# Patient Record
Sex: MALE | Race: WHITE | Employment: OTHER | URBAN - METROPOLITAN AREA
[De-identification: names, ages, dates, MRNs, and addresses within clinical notes are randomized per-mention and may not be internally consistent; named-entity substitution may affect disease eponyms.]

---

## 2022-10-02 ENCOUNTER — HOSPITAL ENCOUNTER (INPATIENT)
Age: 72
LOS: 2 days | Discharge: HOME | DRG: 391 | End: 2022-10-04
Attending: EMERGENCY MEDICINE | Admitting: INTERNAL MEDICINE
Payer: COMMERCIAL

## 2022-10-02 DIAGNOSIS — E83.42 HYPOMAGNESEMIA: ICD-10-CM

## 2022-10-02 DIAGNOSIS — R19.7 DIARRHEA, UNSPECIFIED TYPE: Primary | ICD-10-CM

## 2022-10-02 DIAGNOSIS — R53.1 GENERAL WEAKNESS: ICD-10-CM

## 2022-10-02 DIAGNOSIS — E86.0 DEHYDRATION: ICD-10-CM

## 2022-10-02 DIAGNOSIS — E87.6 HYPOKALEMIA: ICD-10-CM

## 2022-10-02 PROBLEM — Z98.0 HISTORY OF BILLROTH II OPERATION: Status: ACTIVE | Noted: 2021-12-08

## 2022-10-02 PROBLEM — C61 PROSTATE CANCER (HCC): Status: ACTIVE | Noted: 2017-06-02

## 2022-10-02 PROBLEM — G56.03 BILATERAL CARPAL TUNNEL SYNDROME: Status: ACTIVE | Noted: 2021-10-04

## 2022-10-02 PROBLEM — I10 ESSENTIAL HYPERTENSION: Status: ACTIVE | Noted: 2022-10-02

## 2022-10-02 PROBLEM — K92.1 GASTROINTESTINAL HEMORRHAGE WITH MELENA: Status: ACTIVE | Noted: 2022-06-03

## 2022-10-02 PROBLEM — D50.0 IRON DEFICIENCY ANEMIA DUE TO CHRONIC BLOOD LOSS: Status: ACTIVE | Noted: 2021-05-06

## 2022-10-02 PROBLEM — E78.2 MIXED HYPERLIPIDEMIA: Status: ACTIVE | Noted: 2022-10-02

## 2022-10-02 PROBLEM — C91.10 CHRONIC LYMPHOCYTIC LEUKEMIA (HCC): Status: ACTIVE | Noted: 2018-02-28

## 2022-10-02 PROBLEM — E11.40 DIABETIC NEUROPATHY (HCC): Status: ACTIVE | Noted: 2021-03-19

## 2022-10-02 PROBLEM — E43 SEVERE MALNUTRITION (HCC): Status: ACTIVE | Noted: 2022-09-16

## 2022-10-02 PROBLEM — G25.81 RESTLESS LEGS: Status: ACTIVE | Noted: 2017-06-02

## 2022-10-02 LAB
ABSOLUTE EOS #: <0.03 K/UL (ref 0–0.44)
ABSOLUTE IMMATURE GRANULOCYTE: 0.02 K/UL (ref 0–0.3)
ABSOLUTE LYMPH #: 1.51 K/UL (ref 1.1–3.7)
ABSOLUTE MONO #: 0.27 K/UL (ref 0.1–1.2)
ALBUMIN SERPL-MCNC: 3.4 G/DL (ref 3.5–5.2)
ALP BLD-CCNC: 158 U/L (ref 40–129)
ALT SERPL-CCNC: 8 U/L (ref 5–41)
ANION GAP SERPL CALCULATED.3IONS-SCNC: 17 MMOL/L (ref 9–17)
AST SERPL-CCNC: 11 U/L
BASOPHILS # BLD: 1 % (ref 0–2)
BASOPHILS ABSOLUTE: <0.03 K/UL (ref 0–0.2)
BILIRUB SERPL-MCNC: 0.4 MG/DL (ref 0.3–1.2)
BILIRUBIN DIRECT: 0.1 MG/DL
BILIRUBIN, INDIRECT: 0.3 MG/DL (ref 0–1)
BUN BLDV-MCNC: 19 MG/DL (ref 8–23)
BUN/CREAT BLD: 18 (ref 9–20)
CALCIUM SERPL-MCNC: 8.2 MG/DL (ref 8.6–10.4)
CHLORIDE BLD-SCNC: 95 MMOL/L (ref 98–107)
CO2: 21 MMOL/L (ref 20–31)
CREAT SERPL-MCNC: 1.08 MG/DL (ref 0.7–1.2)
EOSINOPHILS RELATIVE PERCENT: 0 % (ref 1–4)
GFR AFRICAN AMERICAN: >60 ML/MIN
GFR NON-AFRICAN AMERICAN: >60 ML/MIN
GFR SERPL CREATININE-BSD FRML MDRD: ABNORMAL ML/MIN/{1.73_M2}
GLUCOSE BLD-MCNC: 176 MG/DL (ref 70–99)
HCT VFR BLD CALC: 40.8 % (ref 40.7–50.3)
HEMOCCULT SP1 STL QL: NEGATIVE
HEMOGLOBIN: 12.9 G/DL (ref 13–17)
IMMATURE GRANULOCYTES: 1 %
INR BLD: 0.9
LIPASE: 11 U/L (ref 13–60)
LYMPHOCYTES # BLD: 42 % (ref 24–43)
MAGNESIUM: 1.4 MG/DL (ref 1.6–2.6)
MCH RBC QN AUTO: 30.1 PG (ref 25.2–33.5)
MCHC RBC AUTO-ENTMCNC: 31.6 G/DL (ref 28–38)
MCV RBC AUTO: 95.1 FL (ref 82.6–102.9)
MONOCYTES # BLD: 7 % (ref 3–12)
PARTIAL THROMBOPLASTIN TIME: 32.4 SEC (ref 23.9–33.8)
PDW BLD-RTO: 14.2 % (ref 11.8–14.4)
PHOSPHORUS: 3.7 MG/DL (ref 2.5–4.5)
PLATELET # BLD: 343 K/UL (ref 138–453)
PMV BLD AUTO: 9.4 FL (ref 8.1–13.5)
POTASSIUM SERPL-SCNC: 3 MMOL/L (ref 3.7–5.3)
PROTHROMBIN TIME: 12.2 SEC (ref 11.5–14.2)
RBC # BLD: 4.29 M/UL (ref 4.21–5.77)
SEG NEUTROPHILS: 50 % (ref 36–65)
SEGMENTED NEUTROPHILS ABSOLUTE COUNT: 1.8 K/UL (ref 1.5–8.1)
SODIUM BLD-SCNC: 133 MMOL/L (ref 135–144)
TOTAL PROTEIN: 5.9 G/DL (ref 6.4–8.3)
TROPONIN, HIGH SENSITIVITY: 17 NG/L (ref 0–22)
WBC # BLD: 3.6 K/UL (ref 3.5–11.3)

## 2022-10-02 PROCEDURE — 2580000003 HC RX 258: Performed by: EMERGENCY MEDICINE

## 2022-10-02 PROCEDURE — 6370000000 HC RX 637 (ALT 250 FOR IP): Performed by: EMERGENCY MEDICINE

## 2022-10-02 PROCEDURE — 2580000003 HC RX 258: Performed by: NURSE PRACTITIONER

## 2022-10-02 PROCEDURE — 83690 ASSAY OF LIPASE: CPT

## 2022-10-02 PROCEDURE — 87205 SMEAR GRAM STAIN: CPT

## 2022-10-02 PROCEDURE — 1200000000 HC SEMI PRIVATE

## 2022-10-02 PROCEDURE — 83735 ASSAY OF MAGNESIUM: CPT

## 2022-10-02 PROCEDURE — 93005 ELECTROCARDIOGRAM TRACING: CPT | Performed by: EMERGENCY MEDICINE

## 2022-10-02 PROCEDURE — 6360000002 HC RX W HCPCS: Performed by: NURSE PRACTITIONER

## 2022-10-02 PROCEDURE — 82272 OCCULT BLD FECES 1-3 TESTS: CPT

## 2022-10-02 PROCEDURE — 85730 THROMBOPLASTIN TIME PARTIAL: CPT

## 2022-10-02 PROCEDURE — 99285 EMERGENCY DEPT VISIT HI MDM: CPT

## 2022-10-02 PROCEDURE — 99222 1ST HOSP IP/OBS MODERATE 55: CPT | Performed by: NURSE PRACTITIONER

## 2022-10-02 PROCEDURE — 80048 BASIC METABOLIC PNL TOTAL CA: CPT

## 2022-10-02 PROCEDURE — 87449 NOS EACH ORGANISM AG IA: CPT

## 2022-10-02 PROCEDURE — 85610 PROTHROMBIN TIME: CPT

## 2022-10-02 PROCEDURE — 87324 CLOSTRIDIUM AG IA: CPT

## 2022-10-02 PROCEDURE — 87506 IADNA-DNA/RNA PROBE TQ 6-11: CPT

## 2022-10-02 PROCEDURE — 80076 HEPATIC FUNCTION PANEL: CPT

## 2022-10-02 PROCEDURE — 6360000002 HC RX W HCPCS: Performed by: EMERGENCY MEDICINE

## 2022-10-02 PROCEDURE — 84100 ASSAY OF PHOSPHORUS: CPT

## 2022-10-02 PROCEDURE — 85025 COMPLETE CBC W/AUTO DIFF WBC: CPT

## 2022-10-02 PROCEDURE — 84484 ASSAY OF TROPONIN QUANT: CPT

## 2022-10-02 PROCEDURE — 2500000003 HC RX 250 WO HCPCS: Performed by: NURSE PRACTITIONER

## 2022-10-02 RX ORDER — TRAMADOL HYDROCHLORIDE 50 MG/1
50 TABLET ORAL EVERY 8 HOURS PRN
COMMUNITY

## 2022-10-02 RX ORDER — MAGNESIUM SULFATE IN WATER 40 MG/ML
2000 INJECTION, SOLUTION INTRAVENOUS ONCE
Status: COMPLETED | OUTPATIENT
Start: 2022-10-02 | End: 2022-10-02

## 2022-10-02 RX ORDER — DIPHENOXYLATE HYDROCHLORIDE AND ATROPINE SULFATE 2.5; .025 MG/1; MG/1
1 TABLET ORAL 4 TIMES DAILY PRN
COMMUNITY
Start: 2022-09-09

## 2022-10-02 RX ORDER — MAGNESIUM SULFATE 1 G/100ML
1000 INJECTION INTRAVENOUS PRN
Status: DISCONTINUED | OUTPATIENT
Start: 2022-10-02 | End: 2022-10-04 | Stop reason: HOSPADM

## 2022-10-02 RX ORDER — 0.9 % SODIUM CHLORIDE 0.9 %
1000 INTRAVENOUS SOLUTION INTRAVENOUS ONCE
Status: COMPLETED | OUTPATIENT
Start: 2022-10-02 | End: 2022-10-02

## 2022-10-02 RX ORDER — ONDANSETRON 2 MG/ML
4 INJECTION INTRAMUSCULAR; INTRAVENOUS EVERY 6 HOURS PRN
Status: DISCONTINUED | OUTPATIENT
Start: 2022-10-02 | End: 2022-10-04 | Stop reason: HOSPADM

## 2022-10-02 RX ORDER — ACETAMINOPHEN 650 MG/1
650 SUPPOSITORY RECTAL EVERY 6 HOURS PRN
Status: DISCONTINUED | OUTPATIENT
Start: 2022-10-02 | End: 2022-10-04 | Stop reason: HOSPADM

## 2022-10-02 RX ORDER — ZOLPIDEM TARTRATE 5 MG/1
5 TABLET ORAL NIGHTLY PRN
COMMUNITY

## 2022-10-02 RX ORDER — ENOXAPARIN SODIUM 100 MG/ML
40 INJECTION SUBCUTANEOUS DAILY
Status: DISCONTINUED | OUTPATIENT
Start: 2022-10-03 | End: 2022-10-04 | Stop reason: HOSPADM

## 2022-10-02 RX ORDER — FEXOFENADINE HCL 180 MG/1
TABLET ORAL
Status: ON HOLD | COMMUNITY
End: 2022-10-03

## 2022-10-02 RX ORDER — POTASSIUM CHLORIDE 20 MEQ/1
40 TABLET, EXTENDED RELEASE ORAL ONCE
Status: COMPLETED | OUTPATIENT
Start: 2022-10-02 | End: 2022-10-02

## 2022-10-02 RX ORDER — PEN NEEDLE, DIABETIC 32GX 5/32"
NEEDLE, DISPOSABLE MISCELLANEOUS
COMMUNITY

## 2022-10-02 RX ORDER — ONDANSETRON 4 MG/1
4 TABLET, ORALLY DISINTEGRATING ORAL EVERY 8 HOURS PRN
Status: DISCONTINUED | OUTPATIENT
Start: 2022-10-02 | End: 2022-10-04 | Stop reason: HOSPADM

## 2022-10-02 RX ORDER — GABAPENTIN 100 MG/1
100 CAPSULE ORAL 3 TIMES DAILY
COMMUNITY

## 2022-10-02 RX ORDER — POTASSIUM CHLORIDE 7.45 MG/ML
10 INJECTION INTRAVENOUS PRN
Status: DISCONTINUED | OUTPATIENT
Start: 2022-10-02 | End: 2022-10-04 | Stop reason: HOSPADM

## 2022-10-02 RX ORDER — INSULIN GLARGINE 100 [IU]/ML
12 INJECTION, SOLUTION SUBCUTANEOUS DAILY
COMMUNITY

## 2022-10-02 RX ORDER — DEXTROSE, SODIUM CHLORIDE, AND POTASSIUM CHLORIDE 5; .45; .15 G/100ML; G/100ML; G/100ML
INJECTION INTRAVENOUS CONTINUOUS
Status: DISCONTINUED | OUTPATIENT
Start: 2022-10-02 | End: 2022-10-02

## 2022-10-02 RX ORDER — SODIUM CHLORIDE 0.9 % (FLUSH) 0.9 %
5-40 SYRINGE (ML) INJECTION EVERY 12 HOURS SCHEDULED
Status: DISCONTINUED | OUTPATIENT
Start: 2022-10-02 | End: 2022-10-04 | Stop reason: HOSPADM

## 2022-10-02 RX ORDER — POLYETHYLENE GLYCOL 3350 17 G/17G
17 POWDER, FOR SOLUTION ORAL DAILY PRN
Status: DISCONTINUED | OUTPATIENT
Start: 2022-10-02 | End: 2022-10-04 | Stop reason: HOSPADM

## 2022-10-02 RX ORDER — HYDROCODONE BITARTRATE AND ACETAMINOPHEN 10; 325 MG/1; MG/1
TABLET ORAL
Status: ON HOLD | COMMUNITY
End: 2022-10-03

## 2022-10-02 RX ORDER — COVID-19 ANTIGEN TEST
KIT MISCELLANEOUS
Status: ON HOLD | COMMUNITY
End: 2022-10-04 | Stop reason: HOSPADM

## 2022-10-02 RX ORDER — POTASSIUM CHLORIDE AND SODIUM CHLORIDE 900; 300 MG/100ML; MG/100ML
INJECTION, SOLUTION INTRAVENOUS CONTINUOUS
Status: DISCONTINUED | OUTPATIENT
Start: 2022-10-02 | End: 2022-10-04

## 2022-10-02 RX ORDER — ACETAMINOPHEN 325 MG/1
650 TABLET ORAL EVERY 6 HOURS PRN
Status: DISCONTINUED | OUTPATIENT
Start: 2022-10-02 | End: 2022-10-04 | Stop reason: HOSPADM

## 2022-10-02 RX ORDER — POTASSIUM CHLORIDE 20 MEQ/1
40 TABLET, EXTENDED RELEASE ORAL PRN
Status: DISCONTINUED | OUTPATIENT
Start: 2022-10-02 | End: 2022-10-04 | Stop reason: HOSPADM

## 2022-10-02 RX ORDER — SODIUM CHLORIDE 0.9 % (FLUSH) 0.9 %
10 SYRINGE (ML) INJECTION PRN
Status: DISCONTINUED | OUTPATIENT
Start: 2022-10-02 | End: 2022-10-04 | Stop reason: HOSPADM

## 2022-10-02 RX ORDER — LOPERAMIDE HYDROCHLORIDE 2 MG/1
2 TABLET ORAL 4 TIMES DAILY PRN
COMMUNITY
Start: 2022-09-29

## 2022-10-02 RX ORDER — SODIUM CHLORIDE 9 MG/ML
INJECTION, SOLUTION INTRAVENOUS PRN
Status: DISCONTINUED | OUTPATIENT
Start: 2022-10-02 | End: 2022-10-04 | Stop reason: HOSPADM

## 2022-10-02 RX ORDER — CHOLECALCIFEROL (VITAMIN D3) 50 MCG
TABLET ORAL
COMMUNITY
Start: 2022-09-18

## 2022-10-02 RX ORDER — FAMOTIDINE 20 MG/1
20 TABLET, FILM COATED ORAL DAILY
COMMUNITY
Start: 2022-09-30 | End: 2022-10-30

## 2022-10-02 RX ADMIN — POTASSIUM CHLORIDE, DEXTROSE MONOHYDRATE AND SODIUM CHLORIDE: 150; 5; 450 INJECTION, SOLUTION INTRAVENOUS at 23:07

## 2022-10-02 RX ADMIN — SODIUM CHLORIDE, PRESERVATIVE FREE 10 ML: 5 INJECTION INTRAVENOUS at 23:05

## 2022-10-02 RX ADMIN — POTASSIUM CHLORIDE AND SODIUM CHLORIDE: 900; 300 INJECTION, SOLUTION INTRAVENOUS at 23:59

## 2022-10-02 RX ADMIN — SODIUM CHLORIDE 1000 ML: 9 INJECTION, SOLUTION INTRAVENOUS at 20:09

## 2022-10-02 RX ADMIN — POTASSIUM CHLORIDE 40 MEQ: 1500 TABLET, EXTENDED RELEASE ORAL at 21:05

## 2022-10-02 RX ADMIN — MAGNESIUM SULFATE HEPTAHYDRATE 2000 MG: 40 INJECTION, SOLUTION INTRAVENOUS at 21:05

## 2022-10-02 ASSESSMENT — PAIN - FUNCTIONAL ASSESSMENT: PAIN_FUNCTIONAL_ASSESSMENT: NONE - DENIES PAIN

## 2022-10-02 ASSESSMENT — ENCOUNTER SYMPTOMS
NAUSEA: 0
TROUBLE SWALLOWING: 0
DIARRHEA: 1
ABDOMINAL PAIN: 0
VOMITING: 0
PHOTOPHOBIA: 0
SHORTNESS OF BREATH: 0
COLOR CHANGE: 0
COUGH: 0

## 2022-10-02 NOTE — ED PROVIDER NOTES
EMERGENCY DEPARTMENT ENCOUNTER    Pt Name: Sarah Torres  MRN: [id number]  [place] 1950  Date of evaluation: 10/2/22  CHIEF COMPLAINT       Chief Complaint   Patient presents with    Dehydration    Diarrhea    Fatigue     HISTORY OF PRESENT ILLNESS   26-year-old male presents to the ER with generalized weakness. Patient states he has been having diarrhea for the last 3 days. Patient states he was admitted recently for diarrhea and they got him to feel better but when he went home he started to have diarrhea again. Patient states that all started roughly 6 weeks ago but did have a period of feeling better after being discharged from the hospital but the symptoms started again 3 days ago. The history is provided by the patient. Diarrhea  Quality:  Watery  Severity:  Moderate  Onset quality:  Gradual  Duration:  3 days  Timing:  Constant  Progression:  Worsening  Relieved by:  Nothing  Worsened by:  Nothing  Ineffective treatments:  None tried  Associated symptoms: no abdominal pain, no arthralgias, no fever, no myalgias and no vomiting          REVIEW OF SYSTEMS     Review of Systems   Constitutional:  Negative for activity change, fatigue and fever. HENT:  Negative for congestion, ear pain and trouble swallowing. Eyes:  Negative for photophobia and visual disturbance. Respiratory:  Negative for cough and shortness of breath. Cardiovascular:  Negative for chest pain and palpitations. Gastrointestinal:  Positive for diarrhea. Negative for abdominal pain, nausea and vomiting. Genitourinary:  Negative for dysuria, flank pain and urgency. Musculoskeletal:  Negative for arthralgias and myalgias. Skin:  Negative for color change and rash. Neurological:  Negative for dizziness and facial asymmetry. Psychiatric/Behavioral:  Negative for agitation and behavioral problems.     PASTMEDICAL HISTORY     Past Medical History:   Diagnosis Date    Cancer ([facility]     Diabetes mellitus ([facility]      Past Problem List  There is no problem list on file for this patient. SURGICAL HISTORY     History reviewed. No pertinent surgical history. CURRENT MEDICATIONS       Previous Medications    CYANOCOBALAMIN 100 MCG TABLET    Take 100 mcg by mouth daily    DIPHENOXYLATE-ATROPINE (LOMOTIL) 2.5-0.025 MG PER TABLET    Take 1 tablet by mouth 4 times daily as needed. FAMOTIDINE (PEPCID) 20 MG TABLET    Take 20 mg by mouth daily    FEXOFENADINE (ALLEGRA) 180 MG TABLET    1 tablet    HYDROCODONE-ACETAMINOPHEN (NORCO)  MG PER TABLET        INSULIN GLARGINE (BASAGLAR KWIKPEN) 100 UNIT/ML INJECTION PEN    inject 15 units subcutaneously daily    INSULIN PEN NEEDLE (BD PEN NEEDLE AMIE 2ND GEN) 32G X 4 MM MISC    use 1 PEN NEEDLE to inject MEDICATION subcutaneously once daily    LOPERAMIDE (IMODIUM A-D) 2 MG TABLET    Take 2 mg by mouth 4 times daily as needed    METFORMIN  MG/5ML SOLN    2 tabs    NAPROXEN SODIUM 220 MG CAPS    1 tablet with food or milk as needed    SITAGLIPTIN (JANUVIA) 100 MG TABLET    take 1 tablet by mouth once daily    TRAMADOL (ULTRAM) 50 MG TABLET    1 tablet as needed    VITAMIN D (CHOLECALCIFEROL) 50 MCG (2000 UT) TABS TABLET    take 1 tablet by mouth every morning    ZOLPIDEM (AMBIEN) 10 MG TABLET    1 tablet at bedtime as needed     ALLERGIES     has No Known Allergies. FAMILY HISTORY     has no family status information on file. SOCIAL HISTORY       Social History     Tobacco Use    Smoking status: Never   Substance Use Topics    Alcohol use: Not Currently    Drug use: Never     PHYSICAL EXAM     INITIAL VITALS: /65   Pulse 89   Temp 97.5 °F (36.4 °C)   Resp 12   Ht 5' 10\" (1.778 m)   Wt 130 lb (59 kg)   SpO2 99%   BMI 18.65 kg/m²    Physical Exam  Constitutional:       General: He is not in acute distress. Appearance: Normal appearance. HENT:      Head: Normocephalic and atraumatic.       Right Ear: External ear normal.      Left Ear: External ear normal. Nose: Nose normal. No congestion or rhinorrhea. Eyes:      Extraocular Movements: Extraocular movements intact. Pupils: Pupils are equal, round, and reactive to light. Cardiovascular:      Rate and Rhythm: Regular rhythm. Tachycardia present. Pulses: Normal pulses. Heart sounds: Normal heart sounds. Pulmonary:      Effort: Pulmonary effort is normal. No respiratory distress. Breath sounds: Normal breath sounds. Abdominal:      General: Bowel sounds are normal.      Palpations: Abdomen is soft. Tenderness: There is no abdominal tenderness. Musculoskeletal:         General: No deformity. Normal range of motion. Cervical back: Normal range of motion. No rigidity. Skin:     General: Skin is warm and dry. Neurological:      General: No focal deficit present. Mental Status: He is alert and oriented to person, place, and time. Mental status is at baseline. Psychiatric:         Mood and Affect: Mood normal.         Behavior: Behavior normal.       MEDICAL DECISION MAKING:   Patient with generalized weakness likely secondary to dehydration. Cardiac work-up in the ER did not display positive signs of acute cardiac ischemia. EKG was reviewed and interpreted by myself, ED physician. Patient provided with fluid resuscitation in the ER which did help with the tachycardia. Patient with hypokalemia as well as hypomagnesemia. Those 2 were replaced in the ER. Patient did come to this facility for a different opinion as compared to the last time he was admitted for the diarrhea. Patient was admitted after speaking with the admitting team.  Patient understands and agrees with the plan.          CRITICAL CARE:       PROCEDURES:    Procedures    DIAGNOSTIC RESULTS   EKG:All EKG's are interpreted by the Emergency Department Physician who either signs or Co-signs this chart in the absence of a cardiologist.        RADIOLOGY:All plain film, CT, MRI, and formal ultrasound images (except ED bedside ultrasound) are read by the radiologist, see reports below, unless otherwisenoted in MDM or here. No orders to display     LABS: All lab results were reviewed by myself, and all abnormals are listed below. Labs Reviewed   MAGNESIUM - Abnormal; Notable for the following components:       Result Value    Magnesium 1.4 (*)     All other components within normal limits   BASIC METABOLIC PANEL - Abnormal; Notable for the following components:    Glucose 176 (*)     Calcium 8.2 (*)     Sodium 133 (*)     Potassium 3.0 (*)     Chloride 95 (*)     All other components within normal limits   CBC WITH AUTO DIFFERENTIAL - Abnormal; Notable for the following components:    Hemoglobin 12.9 (*)     Eosinophils % 0 (*)     Immature Granulocytes 1 (*)     All other components within normal limits   HEPATIC FUNCTION PANEL - Abnormal; Notable for the following components:    Albumin 3.4 (*)     Alkaline Phosphatase 158 (*)     Total Protein 5.9 (*)     All other components within normal limits   LIPASE - Abnormal; Notable for the following components:    Lipase 11 (*)     All other components within normal limits   TROPONIN   APTT   PROTIME-INR   PHOSPHORUS   TROPONIN       EMERGENCY DEPARTMENTCOURSE:         Vitals:    Vitals:    10/02/22 2018 10/02/22 2019 10/02/22 2020 10/02/22 2021   BP:       Pulse: 90 88 89 89   Resp: 13 17 13 12   Temp:       SpO2: 98% 98% 98% 99%   Weight:       Height:           The patient was given the following medications while in the emergency department:  Orders Placed This Encounter   Medications    0.9 % sodium chloride bolus    potassium chloride (KLOR-CON M) extended release tablet 40 mEq    magnesium sulfate 2000 mg in 50 mL IVPB premix     CONSULTS:  IP CONSULT TO INTERNAL MEDICINE    FINAL IMPRESSION      1. Diarrhea, unspecified type    2. Hypokalemia    3. Hypomagnesemia    4. Dehydration    5.  General weakness          DISPOSITION/PLAN   DISPOSITION Decision To Admit 10/02/2022 08:21:04 PM      PATIENT REFERRED TO:  No follow-up provider specified. DISCHARGE MEDICATIONS:  New Prescriptions    No medications on file     The care is provided during an unprecedented national emergency due to the novel coronavirus, COVID 19.   DO Jason Fairbanks DO  10/02/22 2030

## 2022-10-03 PROBLEM — K63.89 SMALL INTESTINAL BACTERIAL OVERGROWTH: Status: ACTIVE | Noted: 2022-10-03

## 2022-10-03 PROBLEM — Z79.4 TYPE 2 DIABETES MELLITUS WITH HYPERGLYCEMIA, WITH LONG-TERM CURRENT USE OF INSULIN (HCC): Status: ACTIVE | Noted: 2022-10-03

## 2022-10-03 PROBLEM — E11.65 TYPE 2 DIABETES MELLITUS WITH HYPERGLYCEMIA, WITH LONG-TERM CURRENT USE OF INSULIN (HCC): Status: ACTIVE | Noted: 2022-10-03

## 2022-10-03 LAB
ALBUMIN SERPL-MCNC: 2.6 G/DL (ref 3.5–5.2)
ALP BLD-CCNC: 108 U/L (ref 40–129)
ALT SERPL-CCNC: 6 U/L (ref 5–41)
ANION GAP SERPL CALCULATED.3IONS-SCNC: 9 MMOL/L (ref 9–17)
AST SERPL-CCNC: 8 U/L
BILIRUB SERPL-MCNC: 0.3 MG/DL (ref 0.3–1.2)
BUN BLDV-MCNC: 16 MG/DL (ref 8–23)
BUN/CREAT BLD: 20 (ref 9–20)
C DIFF AG + TOXIN: NEGATIVE
CALCIUM SERPL-MCNC: 6.7 MG/DL (ref 8.6–10.4)
CHLORIDE BLD-SCNC: 107 MMOL/L (ref 98–107)
CO2: 23 MMOL/L (ref 20–31)
CREAT SERPL-MCNC: 0.8 MG/DL (ref 0.7–1.2)
EKG ATRIAL RATE: 112 BPM
EKG P-R INTERVAL: 112 MS
EKG Q-T INTERVAL: 462 MS
EKG QRS DURATION: 90 MS
EKG QTC CALCULATION (BAZETT): 630 MS
EKG R AXIS: 83 DEGREES
EKG T AXIS: 77 DEGREES
EKG VENTRICULAR RATE: 112 BPM
GFR AFRICAN AMERICAN: >60 ML/MIN
GFR NON-AFRICAN AMERICAN: >60 ML/MIN
GFR SERPL CREATININE-BSD FRML MDRD: ABNORMAL ML/MIN/{1.73_M2}
GLUCOSE BLD-MCNC: 127 MG/DL (ref 75–110)
GLUCOSE BLD-MCNC: 131 MG/DL (ref 75–110)
GLUCOSE BLD-MCNC: 176 MG/DL (ref 75–110)
GLUCOSE BLD-MCNC: 193 MG/DL (ref 75–110)
GLUCOSE BLD-MCNC: 73 MG/DL (ref 75–110)
GLUCOSE BLD-MCNC: 92 MG/DL (ref 70–99)
LACTOFERRIN, QUAL: ABNORMAL
LIPASE: 8 U/L (ref 13–60)
MAGNESIUM: 1.5 MG/DL (ref 1.6–2.6)
MAGNESIUM: 1.6 MG/DL (ref 1.6–2.6)
PHOSPHORUS: 1.9 MG/DL (ref 2.5–4.5)
POTASSIUM SERPL-SCNC: 3.6 MMOL/L (ref 3.7–5.3)
SEDIMENTATION RATE, ERYTHROCYTE: <1 MM/HR (ref 0–20)
SODIUM BLD-SCNC: 139 MMOL/L (ref 135–144)
SPECIMEN DESCRIPTION: NORMAL
TOTAL PROTEIN: 4.1 G/DL (ref 6.4–8.3)
TROPONIN, HIGH SENSITIVITY: 20 NG/L (ref 0–22)
TSH SERPL DL<=0.05 MIU/L-ACNC: 2.25 UIU/ML (ref 0.3–5)

## 2022-10-03 PROCEDURE — 1200000000 HC SEMI PRIVATE

## 2022-10-03 PROCEDURE — 84484 ASSAY OF TROPONIN QUANT: CPT

## 2022-10-03 PROCEDURE — 99222 1ST HOSP IP/OBS MODERATE 55: CPT | Performed by: INTERNAL MEDICINE

## 2022-10-03 PROCEDURE — 85652 RBC SED RATE AUTOMATED: CPT

## 2022-10-03 PROCEDURE — 82947 ASSAY GLUCOSE BLOOD QUANT: CPT

## 2022-10-03 PROCEDURE — 83516 IMMUNOASSAY NONANTIBODY: CPT

## 2022-10-03 PROCEDURE — 83630 LACTOFERRIN FECAL (QUAL): CPT

## 2022-10-03 PROCEDURE — 84100 ASSAY OF PHOSPHORUS: CPT

## 2022-10-03 PROCEDURE — 82705 FATS/LIPIDS FECES QUAL: CPT

## 2022-10-03 PROCEDURE — 6370000000 HC RX 637 (ALT 250 FOR IP): Performed by: NURSE PRACTITIONER

## 2022-10-03 PROCEDURE — 6360000002 HC RX W HCPCS: Performed by: NURSE PRACTITIONER

## 2022-10-03 PROCEDURE — 83520 IMMUNOASSAY QUANT NOS NONAB: CPT

## 2022-10-03 PROCEDURE — 83735 ASSAY OF MAGNESIUM: CPT

## 2022-10-03 PROCEDURE — 84443 ASSAY THYROID STIM HORMONE: CPT

## 2022-10-03 PROCEDURE — 36415 COLL VENOUS BLD VENIPUNCTURE: CPT

## 2022-10-03 PROCEDURE — 80053 COMPREHEN METABOLIC PANEL: CPT

## 2022-10-03 PROCEDURE — 83690 ASSAY OF LIPASE: CPT

## 2022-10-03 PROCEDURE — 99232 SBSQ HOSP IP/OBS MODERATE 35: CPT | Performed by: INTERNAL MEDICINE

## 2022-10-03 RX ORDER — VITAMIN B COMPLEX
2000 TABLET ORAL DAILY
Status: DISCONTINUED | OUTPATIENT
Start: 2022-10-03 | End: 2022-10-04 | Stop reason: HOSPADM

## 2022-10-03 RX ORDER — CETIRIZINE HYDROCHLORIDE 10 MG/1
10 TABLET ORAL DAILY
Status: DISCONTINUED | OUTPATIENT
Start: 2022-10-03 | End: 2022-10-04 | Stop reason: HOSPADM

## 2022-10-03 RX ORDER — GABAPENTIN 100 MG/1
100 CAPSULE ORAL 3 TIMES DAILY
Status: DISCONTINUED | OUTPATIENT
Start: 2022-10-03 | End: 2022-10-04 | Stop reason: HOSPADM

## 2022-10-03 RX ORDER — LEVOCETIRIZINE DIHYDROCHLORIDE 5 MG/1
5 TABLET, FILM COATED ORAL NIGHTLY
Status: ON HOLD | COMMUNITY
End: 2022-10-04 | Stop reason: HOSPADM

## 2022-10-03 RX ORDER — FUROSEMIDE 20 MG/1
20 TABLET ORAL DAILY PRN
Status: ON HOLD | COMMUNITY
End: 2022-10-04 | Stop reason: HOSPADM

## 2022-10-03 RX ORDER — FERROUS SULFATE 325(65) MG
325 TABLET ORAL
COMMUNITY

## 2022-10-03 RX ORDER — UBIDECARENONE 75 MG
100 CAPSULE ORAL DAILY
Status: DISCONTINUED | OUTPATIENT
Start: 2022-10-03 | End: 2022-10-04 | Stop reason: HOSPADM

## 2022-10-03 RX ORDER — CHOLESTYRAMINE LIGHT 4 G/5.7G
4 POWDER, FOR SUSPENSION ORAL 2 TIMES DAILY
Status: DISCONTINUED | OUTPATIENT
Start: 2022-10-03 | End: 2022-10-04 | Stop reason: HOSPADM

## 2022-10-03 RX ORDER — INSULIN GLARGINE 100 [IU]/ML
12 INJECTION, SOLUTION SUBCUTANEOUS DAILY
Status: DISCONTINUED | OUTPATIENT
Start: 2022-10-03 | End: 2022-10-04 | Stop reason: HOSPADM

## 2022-10-03 RX ORDER — ASCORBIC ACID 500 MG
500 TABLET ORAL DAILY
COMMUNITY

## 2022-10-03 RX ORDER — INSULIN LISPRO 100 [IU]/ML
0-4 INJECTION, SOLUTION INTRAVENOUS; SUBCUTANEOUS
Status: DISCONTINUED | OUTPATIENT
Start: 2022-10-03 | End: 2022-10-04 | Stop reason: HOSPADM

## 2022-10-03 RX ORDER — FAMOTIDINE 20 MG/1
20 TABLET, FILM COATED ORAL DAILY
Status: DISCONTINUED | OUTPATIENT
Start: 2022-10-03 | End: 2022-10-04 | Stop reason: HOSPADM

## 2022-10-03 RX ORDER — DEXTROSE MONOHYDRATE 100 MG/ML
INJECTION, SOLUTION INTRAVENOUS CONTINUOUS PRN
Status: DISCONTINUED | OUTPATIENT
Start: 2022-10-03 | End: 2022-10-04 | Stop reason: HOSPADM

## 2022-10-03 RX ORDER — INSULIN LISPRO 100 [IU]/ML
0-4 INJECTION, SOLUTION INTRAVENOUS; SUBCUTANEOUS NIGHTLY
Status: DISCONTINUED | OUTPATIENT
Start: 2022-10-03 | End: 2022-10-04 | Stop reason: HOSPADM

## 2022-10-03 RX ADMIN — PROBIOTIC PRODUCT - TAB 1 TABLET: TAB at 17:52

## 2022-10-03 RX ADMIN — MAGNESIUM SULFATE HEPTAHYDRATE 1000 MG: 1 INJECTION, SOLUTION INTRAVENOUS at 22:10

## 2022-10-03 RX ADMIN — CETIRIZINE HYDROCHLORIDE 10 MG: 10 TABLET ORAL at 09:46

## 2022-10-03 RX ADMIN — FAMOTIDINE 20 MG: 20 TABLET, FILM COATED ORAL at 09:46

## 2022-10-03 RX ADMIN — PROBIOTIC PRODUCT - TAB 1 TABLET: TAB at 09:46

## 2022-10-03 RX ADMIN — GABAPENTIN 100 MG: 100 CAPSULE ORAL at 09:46

## 2022-10-03 RX ADMIN — INSULIN GLARGINE 12 UNITS: 100 INJECTION, SOLUTION SUBCUTANEOUS at 17:52

## 2022-10-03 RX ADMIN — MAGNESIUM SULFATE HEPTAHYDRATE 1000 MG: 1 INJECTION, SOLUTION INTRAVENOUS at 20:57

## 2022-10-03 RX ADMIN — Medication 2000 UNITS: at 09:46

## 2022-10-03 RX ADMIN — PROBIOTIC PRODUCT - TAB 1 TABLET: TAB at 12:34

## 2022-10-03 RX ADMIN — CHOLESTYRAMINE 4 G: 4 POWDER, FOR SUSPENSION ORAL at 20:55

## 2022-10-03 RX ADMIN — VITAM B12 100 MCG: 100 TAB at 09:46

## 2022-10-03 RX ADMIN — POTASSIUM CHLORIDE AND SODIUM CHLORIDE: 900; 300 INJECTION, SOLUTION INTRAVENOUS at 13:24

## 2022-10-03 RX ADMIN — GABAPENTIN 100 MG: 100 CAPSULE ORAL at 17:53

## 2022-10-03 RX ADMIN — GABAPENTIN 100 MG: 100 CAPSULE ORAL at 20:00

## 2022-10-03 RX ADMIN — CHOLESTYRAMINE 4 G: 4 POWDER, FOR SUSPENSION ORAL at 12:34

## 2022-10-03 ASSESSMENT — ENCOUNTER SYMPTOMS
ABDOMINAL PAIN: 0
DIARRHEA: 1
CONSTIPATION: 0
VOMITING: 1
EYES NEGATIVE: 1
NAUSEA: 1
RESPIRATORY NEGATIVE: 1

## 2022-10-03 NOTE — PLAN OF CARE
Problem: Discharge Planning  Goal: Discharge to home or other facility with appropriate resources  Outcome: Progressing  Flowsheets (Taken 10/2/2022 2223)  Discharge to home or other facility with appropriate resources:   Identify barriers to discharge with patient and caregiver   Arrange for needed discharge resources and transportation as appropriate   Identify discharge learning needs (meds, wound care, etc)   Refer to discharge planning if patient needs post-hospital services based on physician order or complex needs related to functional status, cognitive ability or social support system     Problem: Safety - Adult  Goal: Free from fall injury  Outcome: Progressing     Problem: Infection - Adult  Goal: Absence of infection at discharge  Outcome: Progressing  Flowsheets (Taken 10/2/2022 2223)  Absence of infection at discharge:   Assess and monitor for signs and symptoms of infection   Monitor lab/diagnostic results   Administer medications as ordered   Instruct and encourage patient and family to use good hand hygiene technique   Identify and instruct in appropriate isolation precautions for identified infection/condition  Goal: Absence of infection during hospitalization  Outcome: Progressing  Flowsheets (Taken 10/2/2022 2223)  Absence of infection during hospitalization:   Assess and monitor for signs and symptoms of infection   Monitor lab/diagnostic results   Administer medications as ordered   Instruct and encourage patient and family to use good hand hygiene technique   Identify and instruct in appropriate isolation precautions for identified infection/condition     Problem: Metabolic/Fluid and Electrolytes - Adult  Goal: Electrolytes maintained within normal limits  Outcome: Progressing  Flowsheets (Taken 10/2/2022 2223)  Electrolytes maintained within normal limits:   Monitor labs and assess patient for signs and symptoms of electrolyte imbalances   Administer electrolyte replacement as ordered Monitor response to electrolyte replacements, including repeat lab results as appropriate  Goal: Hemodynamic stability and optimal renal function maintained  Outcome: Progressing  Flowsheets (Taken 10/2/2022 2223)  Hemodynamic stability and optimal renal function maintained:   Monitor labs and assess for signs and symptoms of volume excess or deficit   Monitor intake, output and patient weight   Encourage oral intake as appropriate  Goal: Glucose maintained within prescribed range  Outcome: Progressing  Flowsheets (Taken 10/2/2022 2223)  Glucose maintained within prescribed range:   Monitor blood glucose as ordered   Assess for signs and symptoms of hyperglycemia and hypoglycemia   Administer ordered medications to maintain glucose within target range   Assess barriers to adequate nutritional intake and initiate nutrition consult as needed   Instruct patient on self management of diabetes and initiate consult as needed     Problem: Cardiovascular - Adult  Goal: Maintains optimal cardiac output and hemodynamic stability  Outcome: Progressing  Flowsheets (Taken 10/2/2022 2223)  Maintains optimal cardiac output and hemodynamic stability: Monitor blood pressure and heart rate     Problem: Gastrointestinal - Adult  Goal: Minimal or absence of nausea and vomiting  Outcome: Progressing  Flowsheets (Taken 10/2/2022 2223)  Minimal or absence of nausea and vomiting:   Administer IV fluids as ordered to ensure adequate hydration   Administer ordered antiemetic medications as needed   Provide nonpharmacologic comfort measures as appropriate   Advance diet as tolerated, if ordered   Nutrition consult to assist patient with adequate nutrition and appropriate food choices  Goal: Maintains or returns to baseline bowel function  Outcome: Progressing  Flowsheets (Taken 10/2/2022 2223)  Maintains or returns to baseline bowel function:   Assess bowel function   Encourage oral fluids to ensure adequate hydration   Administer IV fluids as ordered to ensure adequate hydration   Administer ordered medications as needed   Encourage mobilization and activity   Nutrition consult to assist patient with appropriate food choices  Goal: Maintains adequate nutritional intake  Outcome: Progressing  Flowsheets (Taken 10/2/2022 2223)  Maintains adequate nutritional intake:   Monitor percentage of each meal consumed   Identify factors contributing to decreased intake, treat as appropriate   Monitor intake and output, weight and lab values     Problem: Musculoskeletal - Adult  Goal: Return mobility to safest level of function  Outcome: Progressing  Flowsheets (Taken 10/2/2022 2223)  Return Mobility to Safest Level of Function:   Assess patient stability and activity tolerance for standing, transferring and ambulating with or without assistive devices   Assist with transfers and ambulation using safe patient handling equipment as needed   Ensure adequate protection for wounds/incisions during mobilization   Obtain physical therapy/occupational therapy consults as needed   Apply continuous passive motion per provider or physical therapy orders to increase flexion toward goal   Instruct patient/family in ordered activity level  Goal: Return ADL status to a safe level of function  Outcome: Progressing  Flowsheets (Taken 10/2/2022 2223)  Return ADL Status to a Safe Level of Function:   Administer medication as ordered   Assess activities of daily living deficits and provide assistive devices as needed   Obtain physical therapy/occupational therapy consults as needed   Assist and instruct patient to increase activity and self care as tolerated

## 2022-10-03 NOTE — CARE COORDINATION
Case Management Initial Discharge Plan  Apple Chamberlain,         Readmission Risk              Risk of Unplanned Readmission:  15             Met with:patient to discuss discharge plans. Information verified: address, contacts, phone number, , insurance Yes  PCP: Alexander Abreu  Date of last visit: few months ago     Insurance Provider: [employer]     Discharge Planning  Current Residence:  1 story home with wife and son   Living Arrangements:  Spouse/Significant Other, Children   Home has 1 stories/3 stairs to climb  Support Systems:  Spouse/Significant Other, Children  Current Services PTA:  na  Agency: na   Patient able to perform ADL's:Independent  DME in home:  none   DME used to aid ambulation prior to admission:   na   DME used during admission:  none     Potential Assistance Needed:  N/A    Ph[facility]: [facility]    Potential Assistance Purchasing Medications:  No  Does patient want to participate in local refill/ meds to beds program?  Yes    Patient agreeable to home care: No  [place] of choice provided:  n/a      Type of Home Care Services:  None  Patient expects to be discharged to:       Prior SNF/Rehab Placement and Facility: no   Agreeable to SNF/Rehab: No  [place] of choice provided: n/a   Evaluation: n/a    Expected Discharge date: Follow Up Appointment: Best Day/ Time:      Transportation provider: wife/son   Transportation arrangements needed for discharge: No    Discharge Plan: Independent, drives. No DME. Declines any dc needs. IVF. GI consult. Hx cdiff.          Electronically signed by Heike Steinberg RN on 10/3/22 at 11:32 AM EDT

## 2022-10-03 NOTE — PROGRESS NOTES
[facility]  Office: [address], DO, Mckenna Sánchez, DO, Paytonmichaelle Krause, DO, Sukumar Sheppard Blood, DO, Bessy Castillo MD, Brendan Monterroso MD, Jermain Gonzalez MD, Diya Loyola MD,  Pily Bal MD, Fiordaliza Pope MD, Ta Rubio DO, Christine Gordillo MD,  Charisma Patterson MD, Cleopatra Gonzales MD, Esau Stone DO, Orlando Haro MD, Carlota Bee MD, Jovanni Martinez MD, Margi Martin MD, Orlin Gracia MD, Renea Jimenez MD, Fide Krishnamurthy DO, Marlys Chambers MD, Blessing Marie MD, He Gamez, CNP,  Dena Gonsales, CNP, Reina Alberts, CNP, Mitchell Taylor, CNP,  Renetta Santiago, [facility], Harper Woody, CNP, Cb Werner, CNP, Rodo York, CNP, Alana Carrillo, CNP, Sandra Oliva, CNP, Rima West, PA-C, Carmela Solitario, CNS, Dank Pierre, [facility], Rik Rincon, CNP, Kamila Martines, CNP, Aure Mcdowell, [facility]    Progress Note    10/3/2022    2:54 PM    Name:   Suresh Brock  MRN:     [id number]     Acct:      [de-identified]   Room:   2026/2026-01   Day:  1  Admit Date:  10/2/2022  7:16 PM    PCP:   Matt Parker MD  Code Status:  Full Code    Subjective:     C/C:   Chief Complaint   Patient presents with    Dehydration    Diarrhea    Fatigue     Interval History Status: not changed. Patient with continued diarrhea, reported 5-6 episodes throughout the day yesterday. Denies any abdominal pain, chest pain, nausea or vomiting, fevers or chills. Has poor appetite. Brief History: This is a 61-year-old male that presents with a complaint of ongoing diarrhea and fatigue. He has been having intermittent diarrhea off and 8-10 episodes per day dating back approximately 6 weeks. He has been evaluated at [place] [address] on 2 separate occasions with GI consultations.   He has had negative stool studies and ultimately with his history of gastric surgery etc. was treated for bacterial overgrowth with Xifaxan after his first admission from September 14 through the 18th. He initially improved but worsened and was readmitted September 28 through the 29th and subsequent discharged again after improvement in his symptoms. Now with recurrent episodes he presents to our facility for further evaluation as his symptoms did not resolve after he was to previous admissions. Review of Systems:     Constitutional:  negative for chills, fevers, sweats, positive for fatigue and loss of appetite  Respiratory:  negative for cough, dyspnea on exertion, shortness of breath, wheezing  Cardiovascular:  negative for chest pain, chest pressure/discomfort, lower extremity edema, palpitations  Gastrointestinal:  negative for abdominal pain, constipation, nausea, vomiting, positive for diarrhea  Neurological:  negative for dizziness, headache    Medications:      Allergies:  No Known Allergies    Current Meds:   Scheduled Meds:    cyanocobalamin  100 mcg Oral Daily    famotidine  20 mg Oral Daily    cetirizine  10 mg Oral Daily    gabapentin  100 mg Oral TID    insulin glargine  12 Units SubCUTAneous Daily    Vitamin D  2,000 Units Oral Daily    insulin lispro  0-4 Units SubCUTAneous TID WC    insulin lispro  0-4 Units SubCUTAneous Nightly    cholestyramine light  4 g Oral BID    sodium chloride flush  5-40 mL IntraVENous 2 times per day    enoxaparin  40 mg SubCUTAneous Daily    lactobacillus  1 tablet Oral TID WC     Continuous Infusions:    dextrose      sodium chloride      0.9% NaCl with KCl 40 mEq 100 mL/hr at 10/03/22 1324     PRN Meds: glucose, dextrose bolus **OR** dextrose bolus, glucagon (rDNA), dextrose, sodium chloride flush, sodium chloride, potassium chloride **OR** potassium alternative oral replacement **OR** potassium chloride, magnesium sulfate, ondansetron **OR** ondansetron, acetaminophen **OR** acetaminophen, polyethylene glycol    Data:     Past Medical History:   has a past medical history of Cancer [facility]), Carpal tunnel syndrome, bilateral upper limbs, Cholecystitis, Clostridium difficile infection, Diabetes mellitus ([address] and Stomach ulcer. Social History:   reports that he has never smoked. He has never used smokeless tobacco. He reports that he does not currently use alcohol. He reports that he does not use drugs. Family History:   Family History   Problem Relation Age of Onset    Other Mother         sepsis    Sudden Death Father     Diabetes Brother     Cancer Brother     Cancer Brother        Vitals:  BP (!) 91/49   Pulse 83   Temp 97.9 °F (36.6 °C) (Oral)   Resp 16   Ht 5' 10\" (1.778 m)   Wt 129 lb 14.4 oz (58.9 kg)   SpO2 99%   BMI 18.64 kg/m²   Temp (24hrs), Av.9 °F (36.6 °C), Min:97.5 °F (36.4 °C), Max:98.1 °F (36.7 °C)    Recent Labs     10/02/22  2357 10/03/22  0659 10/03/22  1115   POCGLU 131* 73* 127*       I/O (24Hr):     Intake/Output Summary (Last 24 hours) at 10/3/2022 1454  Last data filed at 10/3/2022 0603  Gross per 24 hour   Intake 739.59 ml   Output --   Net 739.59 ml       Labs:  Hematology:  Recent Labs     10/02/22  1931 10/03/22  1012   WBC 3.6  --    RBC 4.29  --    HGB 12.9*  --    HCT 40.8  --    MCV 95.1  --    MCH 30.1  --    MCHC 31.6  --    RDW 14.2  --      --    MPV 9.4  --    SEDRATE  --  <1   INR 0.9  --      Chemistry:  Recent Labs     10/02/22  1931 10/03/22  0541   * 139   K 3.0* 3.6*   CL 95* 107   CO2 21 23   GLUCOSE 176* 92   BUN 19 16   CREATININE 1.08 0.80   MG 1.4* 1.6   ANIONGAP 17 9   LABGLOM >60 >60   GFRAA >60 >60   CALCIUM 8.2* 6.7*   PHOS 3.7 1.9*   TROPHS 17 20     Recent Labs     10/02/22  1931 10/02/22  2357 10/03/22  0541 10/03/22  0659 10/03/22  1012 10/03/22  1115   PROT 5.9*  --  4.1*  --   --   --    LABALBU 3.4*  --  2.6*  --   --   --    TSH  --   --   --   --  2.25  --    AST 11  --  8  --   --   --    ALT 8  --  6  --   --   --    ALKPHOS 158*  --  108  --   --   --    BILITOT 0.4  --  0.3  --   --   -- BILIDIR 0.1  --   --   --   --   --    LIPASE 11*  --  8*  --   --   --    POCGLU  --  131*  --  73*  --  127*     ABG:No results found for: POCPH, PHART, PH, POCPCO2, KUJ2ROH, PCO2, POCPO2, PO2ART, PO2, POCHCO3, FME5DTD, HCO3, NBEA, PBEA, BEART, BE, THGBART, THB, CQC2HJU, FXJG0CWA, G9FVZIKR, O2SAT, FIO2  No results found for: SPECIAL  No results found for: CULTURE    Radiology:  No results found.     Physical Examination:        General appearance:  alert, cooperative and no distress  Mental Status:  oriented to person, place and time and normal affect  Lungs:  clear to auscultation bilaterally, normal effort  Heart:  regular rate and rhythm, no murmur  Abdomen:  soft, nontender, nondistended, normal bowel sounds, no masses, hepatomegaly, splenomegaly  Extremities:  no edema, redness, tenderness in the calves  Skin:  no gross lesions, rashes, induration    Assessment:        Hospital Problems             Last Modified POA    * (Principal) Intractable diarrhea 10/2/2022 Yes    Hypokalemia 10/2/2022 Yes    Dehydration 10/2/2022 Yes    Hypomagnesemia 10/2/2022 Yes    Essential hypertension 10/2/2022 Yes    Type 2 diabetes mellitus with hyperglycemia, with long-term current use of insulin ([address] 10/3/2022 Yes    Small intestinal bacterial overgrowth 10/3/2022 Yes    Overview Signed 10/3/2022  2:53 PM by Denise Waterman DO     Diagnosed at [facility] September 2022, prescribed Xifaxan            Plan:        Continue IV hydration  GI consultation  Await stool studies  Monitor and control blood pressure  Insulin scale as ordered  See orders for details    Denise Waterman DO  10/3/2022  2:54 PM

## 2022-10-03 NOTE — ED NOTES
ED to inpatient nurses report     Chief Complaint   Patient presents with    Dehydration    Diarrhea    Fatigue      Present to ED from home. Pt presenting to the ED with diarrhea that has been going on for the last few days. Pt states that for around the last 6 months, pt has been having off and on diarrhea and that he has been seeing doctors with [facility] but was encouraged to seek out a second opinion. Pt states that he has been unable to keep any food down. Pt is A&Ox4.    LOC: alert and orientated to name, place, date  Vital signs   Vitals:    10/02/22 2018 10/02/22 2019 10/02/22 2020 10/02/22 2021   BP:       Pulse: 90 88 89 89   Resp: 13 17 13 12   Temp:       SpO2: 98% 98% 98% 99%   Weight:       Height:          Oxygen Baseline room air    Current needs required n/a   LDAs:   Peripheral IV 10/02/22 Right Antecubital (Active)     Mobility: Requires assistance * 1  Fall Risk:    Pending ED orders: none  Present condition: stable  Code Status: full  Consults: IP CONSULT TO INTERNAL MEDICINE  [x]  Hospitalist  Completed  [x] yes [] no Who: [facility]  []  Medicine  Completed  [] yes [] No Who:   []  Cardiology  Completed  [] yes [] No Who:   []  GI   Completed  [] yes [] No Who:   []  Neurology  Completed  [] yes [] No Who:   []  Nephrology Completed  [] yes [] No Who:    []  Vascular  Completed  [] yes [] No Who:   []  Ortho  Completed  [] yes [] No Who:     []  Surgery  Completed  [] yes [] No Who:    []  Urology  Completed  [] yes [] No Who:    []  CT Surgery Completed  [] yes [] No Who:   []  Podiatry  Completed  [] yes [] No Who:    []  Other    Completed  [] yes [] No Who:  Interventions: IV, labs, normal saline bolus   Important Events: none        Electronically signed by Denise Zhang RN on 10/2/2022 at 8:27 PM       Denise Zhang RN  10/02/22 2031

## 2022-10-03 NOTE — ED NOTES
Pt presenting to the ED with diarrhea that has been going on for the last few days. Pt states that for around the last 6 months, pt has been having off and on diarrhea and that he has been seeing doctors with [facility] but was encouraged to seek out a second opinion. Pt states that he has been unable to keep any food down. Pt is A&Ox4.       Rickie Darby RN  10/02/22 2027

## 2022-10-03 NOTE — ACP (ADVANCE CARE PLANNING)
Advance Care Planning     Advance Care Planning Activator (Inpatient)  Conversation Note      Date of ACP Conversation: 10/3/2022     Conversation Conducted with: Patient with Decision Making Capacity    ACP Activator: Maty Craft RN        Health Care Decision Maker: self     Current Designated Health Care Decision Maker: self     Click here to complete Healthcare Decision Makers including section of the Healthcare Decision Maker Relationship (ie \"Primary\")  Today we documented Decision Maker(s) consistent with Legal Next of Kin hierarchy. Care Preferences    Ventilation: \"If you were in your present state of health and suddenly became very ill and were unable to breathe on your own, what would your preference be about the use of a ventilator (breathing machine) if it were available to you? \"      Would the patient desire the use of ventilator (breathing machine)?: yes    \"If your health worsens and it becomes clear that your chance of recovery is unlikely, what would your preference be about the use of a ventilator (breathing machine) if it were available to you? \"     Would the patient desire the use of ventilator (breathing machine)?: No      Resuscitation  \"CPR works best to restart the heart when there is a sudden event, like a heart attack, in someone who is otherwise healthy. Unfortunately, CPR does not typically restart the heart for people who have serious health conditions or who are very sick. \"    \"In the event your heart stopped as a result of an underlying serious health condition, would you want attempts to be made to restart your heart (answer \"yes\" for attempt to resuscitate) or would you prefer a natural death (answer \"no\" for do not attempt to resuscitate)? \" no       [] Yes   [] No   Educated Patient / Dixfield Apgar regarding differences between Advance Directives and portable DNR orders.     Length of ACP Conversation in minutes:  10    Conversation Outcomes:  [x] ACP discussion completed  [] Existing advance directive reviewed with patient; no changes to patient's previously recorded wishes  [] New Advance Directive completed  [] Portable Do Not Rescitate prepared for Provider review and signature  [] POLST/POST/MOLST/MOST prepared for Provider review and signature      Follow-up plan:    [] Schedule follow-up conversation to continue planning  [x] Referred individual to Provider for additional questions/concerns   [] Advised patient/agent/surrogate to review completed ACP document and update if needed with changes in condition, patient preferences or care setting    [] This note routed to one or more involved healthcare providers

## 2022-10-03 NOTE — H&P
[facility]  Office: [address], DO, Jeremias Riojas, DO, Rayna Eaton, DO, Ingris [place] Blood, DO, Amada Mccarthy MD, Gilma Loaiza MD, Yvone Phalen, MD, Pasha Saunders MD,  Homer Hashimoto, MD, Мария Kilpatrick MD, Diego Madera DO, Anastasiia Mlain MD,  Kelsey Turner MD, Meche Ty MD, Ji Youssef DO, Abril Truong MD, Yas Murray MD, Greta Juarez MD, Ulises Gates MD, Nicky Blue MD, Ibeth Ladd MD, Lesvia Villanueva DO, Mendy Pompa MD, Cely Simpson MD, Katie Balderrama, CNP,  Rosalind Hill, CNP, Lizette Negron, CNP, Ann Hernandez, CNP,  Tam Cano, [facility], Erma Mantilla, CNP, Kenton Cha, CNP, Rylee Massey, CNP, Noemy Villarreal, CNP, Luis Manuel Galloway, CNP, Shruthi Keane PA-C, Brooke Owens, CNS, Kira Lewis, [facility], Robin Frazier, CNP, Thomas Person, CNP, Woody Beck, [facility]    HISTORY AND PHYSICAL EXAMINATION            Date:   10/3/2022  Patient name:  Lois Toure  Date of admission:  10/2/2022  7:16 PM  MRN:   [id number]  Account:  [de-identified]  YOB: 1950  PCP:    No primary care provider on file. Room:   2026/2026-01  Code Status:    Full Code    Chief Complaint:     Chief Complaint   Patient presents with    Dehydration    Diarrhea    Fatigue       History Obtained From:     patient    History of Present Illness:     Lois Toure is a 74 y.o. Non- / non  male who presents with Dehydration, Diarrhea, and Fatigue   and is admitted to the hospital for the management of Intractable diarrhea. Patient says he hasn't been able to eat for the last 3 days. He has been having diarrhea for the last 2 months. He also was having nausea and vomiting yesterday and palpitations after using the bathroom. He has a PMH of DM, HTN, HLD, C. Diff, CLL, skin and prostate cancers.      While in the ED, his K+ and Mg+ were found to be low and were replaced. He also received and IV fluid bolus. He was admitted for further management of intractable diarrhea and dehydration. Past Medical History:     Past Medical History:   Diagnosis Date    [facility]     prostate, skin, leukemia    Carpal tunnel syndrome, bilateral upper limbs     Cholecystitis     Clostridium difficile infection     Diabetes mellitus ([facility]     Stomach ulcer         Past Surgical History:     Past Surgical History:   Procedure Laterality Date    ABDOMEN SURGERY      for stomach ulcers    CARPAL TUNNEL RELEASE Left     CHOLECYSTECTOMY          Medications Prior to Admission:     Prior to Admission medications    Medication Sig Start Date End Date Taking? Authorizing Provider   famotidine (PEPCID) 20 MG tablet Take 20 mg by mouth daily 9/30/22 10/30/22 Yes Historical Provider, MD   diphenoxylate-atropine (LOMOTIL) 2.5-0.025 MG per tablet Take 1 tablet by mouth 4 times daily as needed. 9/9/22  Yes Historical Provider, MD   loperamide (IMODIUM A-D) 2 MG tablet Take 2 mg by mouth 4 times daily as needed 9/29/22  Yes Historical Provider, MD   SITagliptin (JANUVIA) 100 MG tablet take 1 tablet by mouth once daily 6/7/22  Yes Historical Provider, MD   gabapentin (NEURONTIN) 100 MG capsule Take 100 mg by mouth 3 times daily.    Yes Historical Provider, MD   zolpidem (AMBIEN) 10 MG tablet 1 tablet at bedtime as needed    Historical Provider, MD   insulin glargine (BASAGLAR KWIKPEN) 100 UNIT/ML injection pen Inject into the skin 12 units at 3pm    Historical Provider, MD   Insulin Pen Needle (BD PEN NEEDLE AMIE 2ND GEN) 32G X 4 MM MISC use 1 PEN NEEDLE to inject MEDICATION subcutaneously once daily    Historical Provider, MD   fexofenadine (ALLEGRA) 180 MG tablet 1 tablet    Historical Provider, MD   metFORMIN (GLUCOPHAGE) 500 MG tablet 3 times daily    Historical Provider, MD   Naproxen Sodium 220 MG CAPS 1 tablet with food or milk as needed    Historical Provider, MD   HYDROcodone-acetaminophen (NORCO)  MG per tablet     Historical Provider, MD   traMADol (ULTRAM) 50 MG tablet 1 tablet as needed    Historical Provider, MD   vitamin D (CHOLECALCIFEROL) 50 MCG (2000 UT) TABS tablet take 1 tablet by mouth every morning 22   Historical Provider, MD   cyanocobalamin 100 MCG tablet Take 100 mcg by mouth daily    Historical Provider, MD        Allergies:     Patient has no known allergies. Social History:     Tobacco:    reports that he has never smoked. He has never used smokeless tobacco.  Alcohol:      reports that he does not currently use alcohol. Drug Use:  reports no history of drug use. Family History:     Family History   Problem Relation Age of Onset    Other Mother         sepsis    Sudden Death Father     Diabetes Brother     Cancer Brother     Cancer Brother        Review of Systems:     Positive and Negative as described in HPI. Review of Systems   Constitutional:  Positive for appetite change. Negative for chills and fever. HENT: Negative. Eyes: Negative. Respiratory: Negative. Cardiovascular:  Positive for palpitations. Negative for chest pain and leg swelling. Gastrointestinal:  Positive for diarrhea, nausea and vomiting. Negative for abdominal pain and constipation. Genitourinary: Negative. Musculoskeletal: Negative. Skin: Negative. Neurological: Negative. Psychiatric/Behavioral: Negative. Physical Exam:   /62   Pulse 92   Temp 98.1 °F (36.7 °C)   Resp 16   Ht 5' 10\" (1.778 m)   Wt 130 lb (59 kg)   SpO2 97%   BMI 18.65 kg/m²   Temp (24hrs), Av.8 °F (36.6 °C), Min:97.5 °F (36.4 °C), Max:98.1 °F (36.7 °C)    No results for input(s): POCGLU in the last 72 hours. No intake or output data in the 24 hours ending 10/03/22 0005    Physical Exam  Vitals and nursing note reviewed. Constitutional:       Appearance: He is cachectic. He is ill-appearing. He is not toxic-appearing or diaphoretic.    HENT:      Head: Normocephalic and atraumatic. Right Ear: External ear normal.      Left Ear: External ear normal.      Nose: Nose normal. No rhinorrhea. Mouth/Throat:      Mouth: Mucous membranes are dry. Eyes:      General: No scleral icterus. Right eye: No discharge. Left eye: No discharge. Extraocular Movements: Extraocular movements intact. Conjunctiva/sclera: Conjunctivae normal.      Pupils: Pupils are equal, round, and reactive to light. Cardiovascular:      Rate and Rhythm: Regular rhythm. Tachycardia present. Pulses: Normal pulses. Heart sounds: Normal heart sounds. No murmur heard. No friction rub. No gallop. Pulmonary:      Effort: Pulmonary effort is normal. No respiratory distress. Breath sounds: Normal breath sounds. No wheezing, rhonchi or rales. Abdominal:      General: There is distension. Palpations: Abdomen is soft. Tenderness: There is no abdominal tenderness. There is no guarding. Hernia: No hernia is present. Comments: Hyperactive bowel sounds   Musculoskeletal:         General: Normal range of motion. Cervical back: Normal range of motion and neck supple. Right lower leg: No edema. Left lower leg: No edema. Skin:     General: Skin is warm and dry. Coloration: Skin is pale. Skin is not jaundiced. Findings: No bruising, erythema or lesion. Neurological:      General: No focal deficit present. Mental Status: He is alert and oriented to person, place, and time. Psychiatric:         Mood and Affect: Mood normal.         Behavior: Behavior normal.         Thought Content:  Thought content normal.         Judgment: Judgment normal.       Investigations:      Laboratory Testing:  Recent Results (from the past 24 hour(s))   Troponin    Collection Time: 10/02/22  7:31 PM   Result Value Ref Range    Troponin, High Sensitivity 17 0 - 22 ng/L   APTT    Collection Time: 10/02/22  7:31 PM   Result Value Ref Range    PTT 32.4 23.9 - 33.8 sec   Protime-INR    Collection Time: 10/02/22  7:31 PM   Result Value Ref Range    Protime 12.2 11.5 - 14.2 sec    INR 0.9    Phosphorus    Collection Time: 10/02/22  7:31 PM   Result Value Ref Range    Phosphorus 3.7 2.5 - 4.5 mg/dL   Magnesium    Collection Time: 10/02/22  7:31 PM   Result Value Ref Range    Magnesium 1.4 (L) 1.6 - 2.6 mg/dL   Basic Metabolic Panel    Collection Time: 10/02/22  7:31 PM   Result Value Ref Range    Glucose 176 (H) 70 - 99 mg/dL    BUN 19 8 - 23 mg/dL    Creatinine 1.08 0.70 - 1.20 mg/dL    Bun/Cre Ratio 18 9 - 20    Calcium 8.2 (L) 8.6 - 10.4 mg/dL    Sodium 133 (L) 135 - 144 mmol/L    Potassium 3.0 (L) 3.7 - 5.3 mmol/L    Chloride 95 (L) 98 - 107 mmol/L    CO2 21 20 - 31 mmol/L    Anion Gap 17 9 - 17 mmol/L    GFR Non-African American >60 >60 mL/min    GFR African American >60 >60 mL/min    GFR Comment         CBC with Auto Differential    Collection Time: 10/02/22  7:31 PM   Result Value Ref Range    WBC 3.6 3.5 - 11.3 k/uL    RBC 4.29 4. 21 - 5.77 m/uL    Hemoglobin 12.9 (L) 13.0 - 17.0 g/dL    Hematocrit 40.8 40.7 - 50.3 %    MCV 95.1 82.6 - 102.9 fL    MCH 30.1 25.2 - 33.5 pg    MCHC 31.6 28.0 - 38.0 g/dL    RDW 14.2 11.8 - 14.4 %    Platelets 132 726 - 632 k/uL    MPV 9.4 8.1 - 13.5 fL    Seg Neutrophils 50 36 - 65 %    Lymphocytes 42 24 - 43 %    Monocytes 7 3 - 12 %    Eosinophils % 0 (L) 1 - 4 %    Basophils 1 0 - 2 %    Immature Granulocytes 1 (H) 0 %    Segs Absolute 1.80 1.50 - 8.10 k/uL    Absolute Lymph # 1.51 1.10 - 3.70 k/uL    Absolute Mono # 0.27 0.10 - 1.20 k/uL    Absolute Eos # <0.03 0.00 - 0.44 k/uL    Basophils Absolute <0.03 0.00 - 0.20 k/uL    Absolute Immature Granulocyte 0.02 0.00 - 0.30 k/uL   Hepatic Function Panel    Collection Time: 10/02/22  7:31 PM   Result Value Ref Range    Albumin 3.4 (L) 3.5 - 5.2 g/dL    Alkaline Phosphatase 158 (H) 40 - 129 U/L    ALT 8 5 - 41 U/L    AST 11 <40 U/L    Total Bilirubin 0.4 0.3 - 1.2 mg/dL Bilirubin, Direct 0.1 <0.31 mg/dL    Bilirubin, Indirect 0.3 0.00 - 1.00 mg/dL    Total Protein 5.9 (L) 6.4 - 8.3 g/dL   Lipase    Collection Time: 10/02/22  7:31 PM   Result Value Ref Range    Lipase 11 (L) 13 - 60 U/L   Blood Occult Stool Screen #1    Collection Time: 10/02/22 10:30 PM   Result Value Ref Range    Occult Blood, Stool #1 NEGATIVE NEGATIVE       Imaging/Diagnostics:  No results found. Assessment :      Hospital Problems             Last Modified POA    * (Principal) Intractable diarrhea 10/2/2022 Yes    Hypokalemia 10/2/2022 Yes    Dehydration 10/2/2022 Yes    Hypomagnesemia 10/2/2022 Yes    Essential hypertension 10/2/2022 Yes    Type 2 diabetes mellitus with hyperglycemia, with long-term current use of insulin ([facility] 10/3/2022 Yes       Plan:     Patient status inpatient in the  Med/Surge    Intractable diarrhea: GI panel, IV hydration. GI consult. CMP in AM- replace lytes as needed. Hypokalemia: Replace per protocol. Encourage PO intake  Dehydration: IV hydration as ordered. Monitor intake and output. Daily weights. CMP in AM.  Hypomagnesemia: Replace per protocol. Encourage PO intake  HTN: Monitor VS q 4 hours x 12 hours. DM II: Monitor BG AC & HS. Lantus and low correction SSI. Lovenox for DVT prophylaxis. Consultations:   IP CONSULT TO INTERNAL MEDICINE  IP CONSULT TO GI     Patient is admitted as inpatient status because of co-morbidities listed above, severity of signs and symptoms as outlined, requirement for current medical therapies and most importantly because of direct risk to patient if care not provided in a hospital setting. Expected length of stay > 48 hours. LA Iverson NP  10/3/2022  12:05 AM    Copy sent to Dr. Vargas Guzman primary care provider on file.

## 2022-10-03 NOTE — PROGRESS NOTES
Pt admitted to room 2026 from ED. Oriented to room, call light and bed mechanics. Side rails up x2. Call light within reach. Orders reviewed.

## 2022-10-03 NOTE — PROGRESS NOTES
Comprehensive Nutrition Assessment    Type and Reason for Visit:  Initial, Positive Nutrition Screen, Consult (2-13 lb weight loss, decreased appetite, malnutrition score:2 ; dietitian consult: patient has had poor intake for the last 3 days, diarrhea x 8 weeks)    Nutrition Recommendations/Plan:   ADULT DIET; Regular; 4 carb choices (60 gm/meal)  Added Ensure High Protein BID   Monitor PO intakes, diet tolerance, GI status, weights, and labs     Malnutrition Assessment:  Malnutrition Status:  Severe malnutrition (10/03/22 1340)    Context:  Chronic Illness     Findings of the 6 clinical characteristics of malnutrition:  Energy Intake:  75% or less estimated energy requirements for 1 month or longer  Weight Loss:  Greater than 5% over 1 month (7.2% weight loss x 2 months)     Body Fat Loss:  Severe body fat loss Triceps   Muscle Mass Loss:  Severe muscle mass loss Clavicles (pectoralis & deltoids)  Fluid Accumulation:  Mild Extremities   Strength:  Not Performed    Nutrition Assessment:    Patient admission r/t intractable diarrhea. Pt states that for around the last 2 months, pt has been having off and on diarrhea. He reports going to [facility] [employer], reveiving medication, and then going home and diarrhea returning. He reports 2 occureences of this happening. He reports starting weight of 140#, dropped down to 135#, then he was filled with fluids and his weight increased to 150#, he now reports being back down to 130#. Pt states when his stools are liquid (water) he will stop eating everything. He reports not eating 3 days PTA. He reports  He has a PMH of DM, HTN, HLD, C. Diff, CLL, skin and prostate cancers. He also reports gastric bypass surgery r/t stomach ulcers. He is unable to confirm which surgery he had but it does sound like Kala-en-Y procedure. He reports having boost at home and drinking them occasionally. We also discussed fluid intake. He has noticeable fat/muscle mass loss.  Pt is malnourished r/t inability to absorb nutrients. We discussed diarrhea nutrition therapy along with fiber intake. Discussed which fiber to consume for diarrhea. Discussed low fat/ low fiber, bland diet for diarrhea. We discussed ONS reccs. Pt agreed to Ensure High Protein BID. Will monitor PO intakes, ONS acceptance, GI status, weights, and labs. Nutrition Education:  Educated on Diarrhea Nutrition Therapy, Low Fiber Nutrition Therapy, Soluble fiber food recommendations, Dumping syndrome s/sx and nutrition therapy   Learners: Patient  Readiness: Acceptance  Method: Explanation and Handout  Response: Verbalizes Understanding  Contact name and number provided. Nutrition Related Findings:    Hypoactive bowel sounds. diarrhea (none currently). C.diff R/O. No nausea currently. Edema: BLE +1 non-pitting. Labs reviewed Wound Type: None       Current Nutrition Intake & Therapies:    Average Meal Intake: %  Average Supplements Intake: None Ordered  ADULT DIET; Regular; 4 carb choices (60 gm/meal)    Anthropometric Measures:  Height: 5' 10\" (177.8 cm)  Ideal Body Weight (IBW): 166 lbs (75 kg)       Current Body Weight: 129 lb 14.4 oz (58.9 kg), 78.3 % IBW.  Weight Source: Bed Scale  Current BMI (kg/m2): 18.6  Usual Body Weight: 140 lb (63.5 kg)  % Weight Change (Calculated): -7.2  Weight Adjustment For: No Adjustment                 BMI Categories: Underweight (BMI less than 22) age over 72    Estimated Daily Nutrient Needs:  Energy Requirements Based On: Kcal/kg  Weight Used for Energy Requirements: Current  Energy (kcal/day): 0388-1458 kcal (30-32 kcal/kg)  Weight Used for Protein Requirements: Current  Protein (g/day):  gm protein       Nutrition Diagnosis:   Severe malnutrition related to impaired nutrient utilization as evidenced by poor intake prior to admission, weight loss, weight loss greater than or equal to 5% in 1 month, severe loss of subcutaneous fat, severe muscle loss, Criteria as identified in malnutrition assessment  Impaired nutrient utilization related to altered GI function (gastric bypass surgery) as evidenced by GI abnormality, diarrhea    Nutrition Interventions:   Food and/or Nutrient Delivery: Continue Current Diet, Start Oral Nutrition Supplement  Nutrition Education/Counseling: Education completed  Coordination of Nutrition Care: Continue to monitor while inpatient       Goals:     Goals: Meet at least 75% of estimated needs       Nutrition Monitoring and Evaluation:   Behavioral-Environmental Outcomes: None Identified  Food/Nutrient Intake Outcomes: Food and Nutrient Intake, Supplement Intake  Physical Signs/Symptoms Outcomes: Biochemical Data, Skin, Weight, Diarrhea, GI Status, Fluid Status or Edema    Discharge Planning:     Too soon to determine, Continue current diet, Continue Oral Nutrition Supplement     Ramo Cardenas RD, LD  Office Number: [phone]

## 2022-10-03 NOTE — CONSULTS
GI Consult Note:    Name: Deirdre Mijares  MRN: [id number]     Acct: [de-identified]  Room: 2026/2026-01    Admit Date: 10/2/2022  PCP: Marybeth Han MD    Physician Requesting Consult: Nishi Carrillo,      Reason for Consult:    History of chronic diarrhea  History of gastric bypass surgery  ? Dumping syndrome  Weight loss  Abdominal discomfort  Stool incontinence    Chief Complaint:     Chief Complaint   Patient presents with    Dehydration    Diarrhea    Fatigue       History Obtained From:     Patient and EMR    History of Present Illness:      Deirdre Mijares is a  74 y.o.  male who presents with Dehydration, Diarrhea, and Fatigue    This 61-year-old gentleman has history significant for chronic intermittent diarrhea  He has been seen and followed at [address] [place] [address] by gastroenterologist there  He was brought to the emergency room at [facility] with history for what appears to be continuous diarrhea dehydration subsequently got admitted  He had history for gastric bypass surgery done in the past apparently had multiple other surgeries secondary to ulcerations at this point I do not have any detailed records available  It appears that patient's diarrhea is more prominent after he eats also have some feeling of fatigue tiredness dizziness palpitations probable signs of dumping syndrome?   He more or less complains of purging rather than having excessive diarrhea  Diarrhea appears to be osmotic as every time he eats he needs to go to the bathroom  Patient denies any overt bleeding melanotic stools  He had extensive work-up done at [address] [place] [address] including EGD colonoscopy capsule endoscopy at this point I do not have any records but we will review those records  Denies any hematemesis coffee-ground emesis  Patient denies any history for alcohol abuse smoking illicit drug usage  Denies any excessive use of caffeine  Symptoms:  Onset:  Location:  abdomen  Duration:  month(s)  Severity:  mild, moderate  Quality:  intermittent      Past Medical History:     Past Medical History:   Diagnosis Date    Cancer ([facility] [address]     prostate, skin, leukemia    Carpal tunnel syndrome, bilateral upper limbs     Cholecystitis     Clostridium difficile infection     Diabetes mellitus ([facility] [address]     Stomach ulcer         Past Surgical History:     Past Surgical History:   Procedure Laterality Date    ABDOMEN SURGERY      for stomach ulcers    CARPAL TUNNEL RELEASE Left     CHOLECYSTECTOMY          Medications Prior to Admission:       Prior to Admission medications    Medication Sig Start Date End Date Taking? Authorizing Provider   famotidine (PEPCID) 20 MG tablet Take 20 mg by mouth daily 9/30/22 10/30/22 Yes Historical Provider, MD   diphenoxylate-atropine (LOMOTIL) 2.5-0.025 MG per tablet Take 1 tablet by mouth 4 times daily as needed. 9/9/22  Yes Historical Provider, MD   loperamide (IMODIUM A-D) 2 MG tablet Take 2 mg by mouth 4 times daily as needed 9/29/22  Yes Historical Provider, MD   SITagliptin (JANUVIA) 100 MG tablet take 1 tablet by mouth once daily 6/7/22  Yes Historical Provider, MD   gabapentin (NEURONTIN) 100 MG capsule Take 100 mg by mouth 3 times daily.    Yes Historical Provider, MD   zolpidem (AMBIEN) 10 MG tablet 1 tablet at bedtime as needed    Historical Provider, MD   insulin glargine (BASAGLAR KWIKPEN) 100 UNIT/ML injection pen Inject into the skin 12 units at 3pm    Historical Provider, MD   Insulin Pen Needle (BD PEN NEEDLE AMIE 2ND GEN) 32G X 4 MM MISC use 1 PEN NEEDLE to inject MEDICATION subcutaneously once daily    Historical Provider, MD   fexofenadine (ALLEGRA) 180 MG tablet 1 tablet    Historical Provider, MD   metFORMIN (GLUCOPHAGE) 500 MG tablet 3 times daily    Historical Provider, MD   Naproxen Sodium 220 MG CAPS 1 tablet with food or milk as needed    Historical Provider, MD   HYDROcodone-acetaminophen (NORCO)  MG per tablet     Historical Provider, MD   traMADol (ULTRAM) 50 MG tablet 1 tablet as needed    Historical Provider, MD   vitamin D (CHOLECALCIFEROL) 50 MCG (2000 UT) TABS tablet take 1 tablet by mouth every morning 22   Historical Provider, MD   cyanocobalamin 100 MCG tablet Take 100 mcg by mouth daily    Historical Provider, MD        Allergies:       Patient has no known allergies. Social History:     Tobacco:    reports that he has never smoked. He has never used smokeless tobacco.  Alcohol:      reports that he does not currently use alcohol. Drug Use:  reports no history of drug use.     Family History:     Family History   Problem Relation Age of Onset    Other Mother         sepsis    Sudden Death Father     Diabetes Brother     Cancer Brother     Cancer Brother        Review of Systems:     Positive and Negative as described in HPI    Constitutional:  negative for  fevers, chills, sweats, positive fatigue, and weight loss  HEENT:  negative for vision or hearing changes,   Respiratory:  negative for shortness of breath, cough, or congestion  Cardiovascular:  negative for  chest pain, palpitations  Gastrointestinal: Mild nausea, vomiting, positive diarrhea, constipation, abdominal pain  Genitourinary:  negative for frequency, dysuria  Integument: Positive rash, skin lesions  Musculoskeletal: Positive for muscle aches or joint pain  Neurological:  negative for headaches, positive dizziness, lightheadedness, numbness, pain and tingling extrimities  Behavior/Psych:  negative for depression and positive anxiety    Code Status:  Full Code    Physical Exam:     Vitals:  BP (!) 91/49   Pulse 83   Temp 97.9 °F (36.6 °C) (Oral)   Resp 16   Ht 5' 10\" (1.778 m)   Wt 129 lb 14.4 oz (58.9 kg)   SpO2 99%   BMI 18.64 kg/m²   Temp (24hrs), Av.9 °F (36.6 °C), Min:97.5 °F (36.4 °C), Max:98.1 °F (36.7 °C)      General appearance - alert, mild to moderately malnourished sick appearing, and in no acute distress  Mental status - oriented to person, place, and time with anxious affect  Head - normocephalic and atraumatic  Eyes - pupils equal and reactive, extraocular eye movements intact, conjunctiva clear  Ears - hearing appears to be intact  Nose - no drainage noted  Mouth - mucous membranes moist  Neck - supple, no carotid bruits, thyroid not palpable  Chest -few Rales to auscultation, normal effort  Heart - normal rate, regular rhythm, no murmurs  Abdomen - soft, mild tenderness, nondistended, bowel sounds present all four quadrants, no masses, hepatomegaly or splenomegaly.  No hernias  Neurological - normal speech, no focal findings or movement disorder noted, cranial nerves done at this time  Extremities -  no pedal edema or calf pain with palpation  Skin - no gross lesions, rashes, or induration noted  Cranial Nerves : Not done at this time  Lymph nodes: not done at this time    Data:   CBC:   Lab Results   Component Value Date/Time    WBC 3.6 10/02/2022 07:31 PM    RBC 4.29 10/02/2022 07:31 PM    HGB 12.9 10/02/2022 07:31 PM    HCT 40.8 10/02/2022 07:31 PM    MCV 95.1 10/02/2022 07:31 PM    MCH 30.1 10/02/2022 07:31 PM    MCHC 31.6 10/02/2022 07:31 PM    RDW 14.2 10/02/2022 07:31 PM     10/02/2022 07:31 PM    MPV 9.4 10/02/2022 07:31 PM     CBC with Differential:    Lab Results   Component Value Date/Time    WBC 3.6 10/02/2022 07:31 PM    RBC 4.29 10/02/2022 07:31 PM    HGB 12.9 10/02/2022 07:31 PM    HCT 40.8 10/02/2022 07:31 PM     10/02/2022 07:31 PM    MCV 95.1 10/02/2022 07:31 PM    MCH 30.1 10/02/2022 07:31 PM    MCHC 31.6 10/02/2022 07:31 PM    RDW 14.2 10/02/2022 07:31 PM    LYMPHOPCT 42 10/02/2022 07:31 PM    MONOPCT 7 10/02/2022 07:31 PM    BASOPCT 1 10/02/2022 07:31 PM    MONOSABS 0.27 10/02/2022 07:31 PM    LYMPHSABS 1.51 10/02/2022 07:31 PM    EOSABS <0.03 10/02/2022 07:31 PM    BASOSABS <0.03 10/02/2022 07:31 PM     Hemoglobin/Hematocrit:    Lab Results   Component Value Date/Time    HGB 12.9 10/02/2022 07:31 PM    HCT 40.8 10/02/2022 07:31 PM     CMP: Lab Results   Component Value Date/Time     10/03/2022 05:41 AM    K 3.6 10/03/2022 05:41 AM     10/03/2022 05:41 AM    CO2 23 10/03/2022 05:41 AM    BUN 16 10/03/2022 05:41 AM    CREATININE 0.80 10/03/2022 05:41 AM    GFRAA >60 10/03/2022 05:41 AM    LABGLOM >60 10/03/2022 05:41 AM    GLUCOSE 92 10/03/2022 05:41 AM    PROT 4.1 10/03/2022 05:41 AM    LABALBU 2.6 10/03/2022 05:41 AM    CALCIUM 6.7 10/03/2022 05:41 AM    BILITOT 0.3 10/03/2022 05:41 AM    ALKPHOS 108 10/03/2022 05:41 AM    AST 8 10/03/2022 05:41 AM    ALT 6 10/03/2022 05:41 AM     BMP:    Lab Results   Component Value Date/Time     10/03/2022 05:41 AM    K 3.6 10/03/2022 05:41 AM     10/03/2022 05:41 AM    CO2 23 10/03/2022 05:41 AM    BUN 16 10/03/2022 05:41 AM    LABALBU 2.6 10/03/2022 05:41 AM    CREATININE 0.80 10/03/2022 05:41 AM    CALCIUM 6.7 10/03/2022 05:41 AM    GFRAA >60 10/03/2022 05:41 AM    LABGLOM >60 10/03/2022 05:41 AM    GLUCOSE 92 10/03/2022 05:41 AM     PT/INR:    Lab Results   Component Value Date/Time    PROTIME 12.2 10/02/2022 07:31 PM    INR 0.9 10/02/2022 07:31 PM     PTT:    Lab Results   Component Value Date/Time    APTT 32.4 10/02/2022 07:31 PM   [APTT}    Assesment:     Primary Problem  Intractable diarrhea    Active Hospital Problems    Diagnosis Date Noted    Type 2 diabetes mellitus with hyperglycemia, with long-term current use of insulin ([facility] [address] [E11.65, Z79.4] 10/03/2022     Priority: Medium    Hypokalemia [E87.6] 10/02/2022     Priority: Medium    Intractable diarrhea [R19.7] 10/02/2022     Priority: Medium    Dehydration [E86.0] 10/02/2022     Priority: Medium    Hypomagnesemia [E83.42] 10/02/2022     Priority: Medium    Essential hypertension [I10] 10/02/2022     Priority: Medium     History of chronic diarrhea  History of gastric bypass surgery  ?   Dumping syndrome  Weight loss  Abdominal discomfort  Stool incontinence  Plan:     Nonspecific chronic intermittent diarrhea with purging and some in stool incontinence  It appears that patient may have signs and symptoms of dumping syndrome  I had a detailed discussion with him about lifestyle and dietary modification he was instructed to eat cheese and fatty meal to see if that helps with his dumping symptoms  Also started on Questran on him  Bulking agents were suggested  Probiotics  Currently he is clinically feeling well  If he continues to improve okay to discharge by tomorrow morning to be followed by his gastroenterologist at [facility] facility  His questions were answered  Discussed with nursing staff on the floor        Thank you for allowing me to participate in the care of your patient. Please feel free to contact me with any questions or concerns.      Electronically signed by Bisi Mcdaniel MD on 10/3/2022 at 12:35 PM     Copy sent to Dr. Julisa Byrne MD

## 2022-10-03 NOTE — PROGRESS NOTES
Transitions of Care Pharmacy Service   Medication Review    The patient's list of current home medications has been reviewed and updated. Source(s) of information: [facility] ([facility] on [address]), care everywhere    Please note:   Reilly Iglesias Face has been held since beginning of sept by oncology due to improving symptoms.  -Basaglar script has directions of 15 units daily from endocrinology and metformin is 1000mg in the morning and 500mg in the evening. Please feel free to call with any questions about this encounter. Thank you. Zach Garza, [facility]  Transitions of Care Pharmacy Service  Phone:  [phone]  Fax: [phone]      Prior to Admission medications    Medication Sig Start Date End Date Taking? Authorizing Provider   levocetirizine (XYZAL) 5 MG tablet Take 5 mg by mouth nightly   Yes Historical Provider, MD   furosemide (LASIX) 20 MG tablet Take 20 mg by mouth daily as needed (leg swelling)   Yes Historical Provider, MD   famotidine (PEPCID) 20 MG tablet Take 20 mg by mouth daily 9/30/22 10/30/22 Yes Historical Provider, MD   diphenoxylate-atropine (LOMOTIL) 2.5-0.025 MG per tablet Take 1 tablet by mouth 4 times daily as needed. 9/9/22  Yes Historical Provider, MD   loperamide (IMODIUM A-D) 2 MG tablet Take 2 mg by mouth 4 times daily as needed 9/29/22  Yes Historical Provider, MD   SITagliptin (JANUVIA) 100 MG tablet take 1 tablet by mouth once daily 6/7/22  Yes Historical Provider, MD   gabapentin (NEURONTIN) 100 MG capsule Take 100 mg by mouth 3 times daily. Yes Historical Provider, MD   zolpidem (AMBIEN) 5 MG tablet Take 5 mg by mouth nightly as needed for Sleep.     Historical Provider, MD   insulin glargine (BASAGLAR KWIKPEN) 100 UNIT/ML injection pen Inject 15 Units into the skin daily    Historical Provider, MD   Insulin Pen Needle (BD PEN NEEDLE AMIE 2ND GEN) 32G X 4 MM MISC use 1 PEN NEEDLE to inject MEDICATION subcutaneously once daily    Historical Provider, MD metFORMIN (GLUCOPHAGE) 500 MG tablet Take 500-1,000 mg by mouth 2 times daily (with meals) Indications: 1000mg in AM and 500mg in PM    Historical Provider, MD   Naproxen Sodium 220 MG CAPS 1 tablet with food or milk as needed    Historical Provider, MD   traMADol (ULTRAM) 50 MG tablet Take 50 mg by mouth every 8 hours as needed for Pain.     Historical Provider, MD   vitamin D (CHOLECALCIFEROL) 50 MCG (2000 UT) TABS tablet take 1 tablet by mouth every morning 9/18/22   Historical Provider, MD   cyanocobalamin 100 MCG tablet Take 100 mcg by mouth daily    Historical Provider, MD

## 2022-10-04 VITALS
OXYGEN SATURATION: 95 % | BODY MASS INDEX: 20.39 KG/M2 | RESPIRATION RATE: 16 BRPM | DIASTOLIC BLOOD PRESSURE: 64 MMHG | SYSTOLIC BLOOD PRESSURE: 102 MMHG | HEIGHT: 70 IN | HEART RATE: 81 BPM | TEMPERATURE: 97.9 F | WEIGHT: 142.4 LBS

## 2022-10-04 PROBLEM — E83.42 HYPOMAGNESEMIA: Status: RESOLVED | Noted: 2022-10-02 | Resolved: 2022-10-04

## 2022-10-04 PROBLEM — E87.6 HYPOKALEMIA: Status: RESOLVED | Noted: 2022-10-02 | Resolved: 2022-10-04

## 2022-10-04 PROBLEM — K91.1 DUMPING SYNDROME: Status: ACTIVE | Noted: 2022-10-02

## 2022-10-04 PROBLEM — R19.7 INTRACTABLE DIARRHEA: Status: ACTIVE | Noted: 2022-10-04

## 2022-10-04 LAB
ABSOLUTE EOS #: 0.03 K/UL (ref 0–0.44)
ABSOLUTE IMMATURE GRANULOCYTE: 0.05 K/UL (ref 0–0.3)
ABSOLUTE LYMPH #: 1.14 K/UL (ref 1.1–3.7)
ABSOLUTE MONO #: 0.26 K/UL (ref 0.1–1.2)
ANION GAP SERPL CALCULATED.3IONS-SCNC: 8 MMOL/L (ref 9–17)
BASOPHILS # BLD: 0 % (ref 0–2)
BASOPHILS ABSOLUTE: <0.03 K/UL (ref 0–0.2)
BUN BLDV-MCNC: 11 MG/DL (ref 8–23)
BUN/CREAT BLD: 16 (ref 9–20)
CALCIUM SERPL-MCNC: 7.4 MG/DL (ref 8.6–10.4)
CAMPYLOBACTER PCR: NORMAL
CHLORIDE BLD-SCNC: 111 MMOL/L (ref 98–107)
CO2: 20 MMOL/L (ref 20–31)
CREAT SERPL-MCNC: 0.7 MG/DL (ref 0.7–1.2)
E COLI ENTEROTOXIGENIC PCR: NORMAL
EOSINOPHILS RELATIVE PERCENT: 1 % (ref 1–4)
GFR SERPL CREATININE-BSD FRML MDRD: >60 ML/MIN/1.73M2
GLUCOSE BLD-MCNC: 144 MG/DL (ref 75–110)
GLUCOSE BLD-MCNC: 93 MG/DL (ref 70–99)
GLUCOSE BLD-MCNC: 93 MG/DL (ref 75–110)
GLUCOSE BLD-MCNC: 95 MG/DL (ref 75–110)
HCT VFR BLD CALC: 31.7 % (ref 40.7–50.3)
HEMOGLOBIN: 9.6 G/DL (ref 13–17)
IMMATURE GRANULOCYTES: 1 %
LYMPHOCYTES # BLD: 23 % (ref 24–43)
MAGNESIUM: 1.6 MG/DL (ref 1.6–2.6)
MCH RBC QN AUTO: 29.5 PG (ref 25.2–33.5)
MCHC RBC AUTO-ENTMCNC: 30.3 G/DL (ref 28.4–34.8)
MCV RBC AUTO: 97.5 FL (ref 82.6–102.9)
MONOCYTES # BLD: 5 % (ref 3–12)
NRBC AUTOMATED: 0 PER 100 WBC
PDW BLD-RTO: 14.3 % (ref 11.8–14.4)
PLATELET # BLD: 281 K/UL (ref 138–453)
PLESIOMONAS SHIGELLOIDES PCR: NORMAL
PMV BLD AUTO: 8.5 FL (ref 8.1–13.5)
POTASSIUM SERPL-SCNC: 4.3 MMOL/L (ref 3.7–5.3)
RBC # BLD: 3.25 M/UL (ref 4.21–5.77)
SALMONELLA PCR: NORMAL
SEG NEUTROPHILS: 70 % (ref 36–65)
SEGMENTED NEUTROPHILS ABSOLUTE COUNT: 3.44 K/UL (ref 1.5–8.1)
SHIGATOXIN GENE PCR: NORMAL
SHIGELLA SP PCR: NORMAL
SODIUM BLD-SCNC: 139 MMOL/L (ref 135–144)
SPECIMEN DESCRIPTION: NORMAL
TISSUE TRANSGLUTAMINASE IGA: <0.1 U/ML
VIBRIO PCR: NORMAL
WBC # BLD: 4.9 K/UL (ref 3.5–11.3)
YERSINIA ENTEROCOLITICA PCR: NORMAL

## 2022-10-04 PROCEDURE — 82947 ASSAY GLUCOSE BLOOD QUANT: CPT

## 2022-10-04 PROCEDURE — 80048 BASIC METABOLIC PNL TOTAL CA: CPT

## 2022-10-04 PROCEDURE — 83735 ASSAY OF MAGNESIUM: CPT

## 2022-10-04 PROCEDURE — 6370000000 HC RX 637 (ALT 250 FOR IP): Performed by: NURSE PRACTITIONER

## 2022-10-04 PROCEDURE — 6360000002 HC RX W HCPCS: Performed by: NURSE PRACTITIONER

## 2022-10-04 PROCEDURE — APPSS30 APP SPLIT SHARED TIME 16-30 MINUTES: Performed by: NURSE PRACTITIONER

## 2022-10-04 PROCEDURE — 36415 COLL VENOUS BLD VENIPUNCTURE: CPT

## 2022-10-04 PROCEDURE — 85025 COMPLETE CBC W/AUTO DIFF WBC: CPT

## 2022-10-04 RX ORDER — CHOLESTYRAMINE LIGHT 4 G/5.7G
4 POWDER, FOR SUSPENSION ORAL 2 TIMES DAILY
Qty: 60 PACKET | Refills: 3 | Status: SHIPPED | OUTPATIENT
Start: 2022-10-04 | End: 2022-11-03

## 2022-10-04 RX ORDER — DIPHENHYDRAMINE HCL 25 MG
25 TABLET ORAL ONCE
Status: COMPLETED | OUTPATIENT
Start: 2022-10-04 | End: 2022-10-04

## 2022-10-04 RX ADMIN — GABAPENTIN 100 MG: 100 CAPSULE ORAL at 12:56

## 2022-10-04 RX ADMIN — DIPHENHYDRAMINE HYDROCHLORIDE 25 MG: 25 TABLET ORAL at 05:08

## 2022-10-04 RX ADMIN — MAGNESIUM SULFATE HEPTAHYDRATE 1000 MG: 1 INJECTION, SOLUTION INTRAVENOUS at 09:58

## 2022-10-04 RX ADMIN — GABAPENTIN 100 MG: 100 CAPSULE ORAL at 09:51

## 2022-10-04 RX ADMIN — MAGNESIUM SULFATE HEPTAHYDRATE 1000 MG: 1 INJECTION, SOLUTION INTRAVENOUS at 11:12

## 2022-10-04 RX ADMIN — VITAM B12 100 MCG: 100 TAB at 09:51

## 2022-10-04 RX ADMIN — INSULIN GLARGINE 12 UNITS: 100 INJECTION, SOLUTION SUBCUTANEOUS at 16:35

## 2022-10-04 RX ADMIN — POTASSIUM CHLORIDE AND SODIUM CHLORIDE: 900; 300 INJECTION, SOLUTION INTRAVENOUS at 02:15

## 2022-10-04 RX ADMIN — PROBIOTIC PRODUCT - TAB 1 TABLET: TAB at 16:41

## 2022-10-04 RX ADMIN — CHOLESTYRAMINE 4 G: 4 POWDER, FOR SUSPENSION ORAL at 09:52

## 2022-10-04 RX ADMIN — PROBIOTIC PRODUCT - TAB 1 TABLET: TAB at 12:56

## 2022-10-04 RX ADMIN — CETIRIZINE HYDROCHLORIDE 10 MG: 10 TABLET ORAL at 09:51

## 2022-10-04 RX ADMIN — FAMOTIDINE 20 MG: 20 TABLET, FILM COATED ORAL at 09:51

## 2022-10-04 RX ADMIN — PROBIOTIC PRODUCT - TAB 1 TABLET: TAB at 09:51

## 2022-10-04 RX ADMIN — Medication 2000 UNITS: at 09:51

## 2022-10-04 RX ADMIN — ENOXAPARIN SODIUM 40 MG: 100 INJECTION SUBCUTANEOUS at 09:52

## 2022-10-04 NOTE — PROGRESS NOTES
RN called IL at [facility]. V's to see if patient's stool specimen was still available to add on fecal fat and pancreatic elastase studies. Lab states stool sample still available and labs can be added on.

## 2022-10-04 NOTE — PROGRESS NOTES
[facility]  Office: [address], DO, Belem Reeder, DO, Lindsay Yadav, DO, Melinda Pasquale Blood, DO, Master Ruiz MD, Sidney Sanon MD, Pj Carrion MD, Kaity Carmichael MD,  Tian Means MD, Elder Huitron MD, Audi Robles, DO, Tad Goodrich MD,  Aditi Gonzalez MD, Anel Anderson MD, Donell Jones, DO, Mireya Yarbrough MD, Mayra Ferguson MD, Page Blood MD, Hardeep Dorado MD, hCris Campuzano MD, Jena Harrell MD, Dawson Monaco, DO, Cydney Mariscal MD, Lisha Umaña MD, Noé Dietrich CNP,  Silvano Matamoros, CNP, Craig Sever, CNP, Hugo Cabral, CNP,  Noah Del Castillo, [facility], Josemanuel Alvarez, CNP, Jose Pagan, CNP, Joseph Evans, CNP, Simeon Colbert, CNP, Javier Rojas, CNP, LISETTE Del Valle-C, Jose Lewis, CNS, Greg Byrne, ROSALINDA, Sadaf De Leon, LIA, Marian Nguyen CNP, Bartolo Lim, [facility]    Progress Note    10/4/2022    11:01 AM    Name:   Tyra Watts  MRN:     [id number]     Acct:      [de-identified]   Room:   2026/2026-01   Day:  2  Admit Date:  10/2/2022  7:16 PM    PCP:   Melita Baca MD  Code Status:  Full Code    Subjective:     C/C:   Chief Complaint   Patient presents with    Dehydration    Diarrhea    Fatigue     Interval History Status: improved. And reports that symptoms have improved, he is no longer having diarrhea and is having solid bowel movements. He denies any new complaints. Vital signs stable    Brief History:   Per my partner  This is a 68-year-old male that presents with a complaint of ongoing diarrhea and fatigue. He has been having intermittent diarrhea off and 8-10 episodes per day dating back approximately 6 weeks. He has been evaluated at [place] [address] on 2 separate occasions with GI consultations.   He has had negative stool studies and ultimately with his history of gastric surgery etc. was treated for bacterial overgrowth with Xifaxan after his first admission from September 14 through the 18th. He initially improved but worsened and was readmitted September 28 through the 29th and subsequent discharged again after improvement in his symptoms. Now with recurrent episodes he presents to our facility for further evaluation as his symptoms did not resolve after he was to previous admissions. Review of Systems:     Constitutional:  negative for chills, fevers, sweats  Respiratory:  negative for cough, dyspnea on exertion, shortness of breath, wheezing  Cardiovascular:  negative for chest pain, chest pressure/discomfort, lower extremity edema, palpitations  Gastrointestinal:  negative for abdominal pain, constipation, diarrhea, nausea, vomiting  Neurological:  negative for dizziness, headache    Medications:      Allergies:  No Known Allergies    Current Meds:   Scheduled Meds:    cyanocobalamin  100 mcg Oral Daily    famotidine  20 mg Oral Daily    cetirizine  10 mg Oral Daily    gabapentin  100 mg Oral TID    insulin glargine  12 Units SubCUTAneous Daily    Vitamin D  2,000 Units Oral Daily    insulin lispro  0-4 Units SubCUTAneous TID WC    insulin lispro  0-4 Units SubCUTAneous Nightly    cholestyramine light  4 g Oral BID    sodium chloride flush  5-40 mL IntraVENous 2 times per day    enoxaparin  40 mg SubCUTAneous Daily    lactobacillus  1 tablet Oral TID WC     Continuous Infusions:    dextrose      sodium chloride      0.9% NaCl with KCl 40 mEq 100 mL/hr at 10/04/22 0449     PRN Meds: glucose, dextrose bolus **OR** dextrose bolus, glucagon (rDNA), dextrose, sodium chloride flush, sodium chloride, potassium chloride **OR** potassium alternative oral replacement **OR** potassium chloride, magnesium sulfate, ondansetron **OR** ondansetron, acetaminophen **OR** acetaminophen, polyethylene glycol    Data:     Past Medical History:   has a past medical history of Cancer ([facility] [address] Carpal tunnel syndrome, bilateral upper limbs, 10/03/22  2024 10/04/22  0645   PROT 5.9*  --  4.1*  --   --   --   --   --   --    LABALBU 3.4*  --  2.6*  --   --   --   --   --   --    TSH  --   --   --   --  2.25  --   --   --   --    AST 11  --  8  --   --   --   --   --   --    ALT 8  --  6  --   --   --   --   --   --    ALKPHOS 158*  --  108  --   --   --   --   --   --    BILITOT 0.4  --  0.3  --   --   --   --   --   --    BILIDIR 0.1  --   --   --   --   --   --   --   --    LIPASE 11*  --  8*  --   --   --   --   --   --    POCGLU  --  131*  --  73*  --  127* 176* 193* 95     ABG:No results found for: POCPH, PHART, PH, POCPCO2, VZZ1MLM, PCO2, POCPO2, PO2ART, PO2, POCHCO3, KQL0NWZ, HCO3, NBEA, PBEA, BEART, BE, THGBART, THB, HMA5KFG, JUYX3XQA, S5CRQHTA, O2SAT, FIO2  No results found for: SPECIAL  No results found for: CULTURE    Radiology:  No results found.     Physical Examination:        General appearance:  alert, cooperative and no distress  Mental Status:  oriented to person, place and time and normal affect  Lungs:  clear to auscultation bilaterally, normal effort  Heart:  regular rate and rhythm, no murmur  Abdomen:  soft, nontender, nondistended, normal bowel sounds, no masses, hepatomegaly, splenomegaly  Extremities:  no edema, redness, tenderness in the calves  Skin:  no gross lesions, rashes, induration    Assessment:        Hospital Problems             Last Modified POA    * (Principal) Intractable diarrhea 10/2/2022 Yes    Hypokalemia 10/2/2022 Yes    Dehydration 10/2/2022 Yes    Hypomagnesemia 10/2/2022 Yes    Essential hypertension 10/2/2022 Yes    Type 2 diabetes mellitus with hyperglycemia, with long-term current use of insulin ([address] 10/3/2022 Yes    Small intestinal bacterial overgrowth 10/3/2022 Yes    Overview Signed 10/3/2022  2:53 PM by Mary Turcios DO     Diagnosed at [facility] September 2022, prescribed Xifaxan            Plan:        Intractable diarrhea distant with dumping syndrome secondary to history of gastric bypass surgery-now resolved, continue probiotics, getting agents and Questran as ordered per GI  Monitor labs, replace electrolytes as needed  discontinue IV fluids  Continue diabetic diet  Discharge home  Plan discussed with patient and staff    LA Sethi NP  10/4/2022  11:01 AM

## 2022-10-04 NOTE — PROGRESS NOTES
GI Progress notes    10/4/2022   2:13 PM    Name:  Brandon Krishna  MRN:    [id number]     Acct:     [de-identified]   Room:  2026/2026-01  IP Day: 2     Admit Date: 10/2/2022  7:16 PM  PCP: Julisa Byrne MD    Subjective:     C/C:   Chief Complaint   Patient presents with    Dehydration    Diarrhea    Fatigue       Interval History: Status: improved. Patient seen and examined. No acute events. Diarrhea resolved  Reports formed BM this am  Feels well  Tolerating diet. Plans for discharge    ROS:  Constitutional: negative for chills, fevers and sweats  Gastrointestinal: negative for abdominal pain, constipation, diarrhea, nausea and vomiting  Neurological: negative for dizziness and headaches    Medications:      Allergies: No Known Allergies    Current Meds: vitamin B-12 (CYANOCOBALAMIN) tablet 100 mcg, Daily  famotidine (PEPCID) tablet 20 mg, Daily  cetirizine (ZYRTEC) tablet 10 mg, Daily  gabapentin (NEURONTIN) capsule 100 mg, TID  insulin glargine (LANTUS) injection vial 12 Units, Daily  vitamin D (CHOLECALCIFEROL) tablet 2,000 Units, Daily  glucose chewable tablet 16 g, PRN  dextrose bolus 10% 125 mL, PRN   Or  dextrose bolus 10% 250 mL, PRN  glucagon (rDNA) injection 1 mg, PRN  dextrose 10 % infusion, Continuous PRN  insulin lispro (HUMALOG) injection vial 0-4 Units, TID WC  insulin lispro (HUMALOG) injection vial 0-4 Units, Nightly  cholestyramine light packet 4 g, BID  sodium chloride flush 0.9 % injection 5-40 mL, 2 times per day  sodium chloride flush 0.9 % injection 10 mL, PRN  0.9 % sodium chloride infusion, PRN  potassium chloride (KLOR-CON M) extended release tablet 40 mEq, PRN   Or  potassium bicarb-citric acid (EFFER-K) effervescent tablet 40 mEq, PRN   Or  potassium chloride 10 mEq/100 mL IVPB (Peripheral Line), PRN  magnesium sulfate 1000 mg in dextrose 5% 100 mL IVPB, PRN  enoxaparin (LOVENOX) injection 40 mg, Daily  ondansetron (ZOFRAN-ODT) disintegrating tablet 4 mg, Q8H PRN   Or  ondansetron [facility]) injection 4 mg, Q6H PRN  acetaminophen (TYLENOL) tablet 650 mg, Q6H PRN   Or  acetaminophen (TYLENOL) suppository 650 mg, Q6H PRN  polyethylene glycol (GLYCOLAX) packet 17 g, Daily PRN  0.9% NaCl with KCl 40 mEq infusion, Continuous  lactobacillus (BACID) tablet 1 tablet, TID WC        Data:     Code Status:  Full Code    Family History   Problem Relation Age of Onset    Other Mother         sepsis    Sudden Death Father     Diabetes Brother     Cancer Brother     Cancer Brother        Social History     Socioeconomic History    Marital status:      Spouse name: Not on file    Number of children: Not on file    Years of education: Not on file    Highest education level: Not on file   Occupational History    Not on file   Tobacco Use    Smoking status: Never    Smokeless tobacco: Never   Vaping Use    Vaping Use: Never used   Substance and Sexual Activity    Alcohol use: Not Currently    Drug use: Never    Sexual activity: Not on file   Other Topics Concern    Not on file   Social History Narrative    Not on file     Social Determinants of Health     Financial Resource Strain: Not on file   Food Insecurity: Not on file   Transportation Needs: Not on file   Physical Activity: Not on file   Stress: Not on file   Social Connections: Not on file   Intimate Partner Violence: Not on file   Housing Stability: Not on file       Vitals:  BP (!) 104/55   Pulse 80   Temp 98.2 °F (36.8 °C) (Oral)   Resp 18   Ht 5' 10\" (1.778 m)   Wt 142 lb 6.4 oz (64.6 kg)   SpO2 96%   BMI 20.43 kg/m²   Temp (24hrs), Av °F (36.7 °C), Min:97.6 °F (36.4 °C), Max:98.2 °F (36.8 °C)    Recent Labs     10/03/22  1702 10/03/22  2024 10/04/22  0645 10/04/22  1155   POCGLU 176* 193* 95 144*       I/O (24Hr):     Intake/Output Summary (Last 24 hours) at 10/4/2022 1413  Last data filed at 10/4/2022 1256  Gross per 24 hour   Intake 2959.52 ml   Output 1000 ml   Net 1959.52 ml       Labs:      CBC:   Lab Results   Component Value Date/Time    WBC 4.9 10/04/2022 06:09 AM    RBC 3.25 10/04/2022 06:09 AM    HGB 9.6 10/04/2022 06:09 AM    HCT 31.7 10/04/2022 06:09 AM    MCV 97.5 10/04/2022 06:09 AM    MCH 29.5 10/04/2022 06:09 AM    MCHC 30.3 10/04/2022 06:09 AM    RDW 14.3 10/04/2022 06:09 AM     10/04/2022 06:09 AM    MPV 8.5 10/04/2022 06:09 AM     CBC with Differential:    Lab Results   Component Value Date/Time    WBC 4.9 10/04/2022 06:09 AM    RBC 3.25 10/04/2022 06:09 AM    HGB 9.6 10/04/2022 06:09 AM    HCT 31.7 10/04/2022 06:09 AM     10/04/2022 06:09 AM    MCV 97.5 10/04/2022 06:09 AM    MCH 29.5 10/04/2022 06:09 AM    MCHC 30.3 10/04/2022 06:09 AM    RDW 14.3 10/04/2022 06:09 AM    LYMPHOPCT 23 10/04/2022 06:09 AM    MONOPCT 5 10/04/2022 06:09 AM    BASOPCT 0 10/04/2022 06:09 AM    MONOSABS 0.26 10/04/2022 06:09 AM    LYMPHSABS 1.14 10/04/2022 06:09 AM    EOSABS 0.03 10/04/2022 06:09 AM    BASOSABS <0.03 10/04/2022 06:09 AM     Hemoglobin/Hematocrit:    Lab Results   Component Value Date/Time    HGB 9.6 10/04/2022 06:09 AM    HCT 31.7 10/04/2022 06:09 AM     CMP:    Lab Results   Component Value Date/Time     10/04/2022 06:09 AM    K 4.3 10/04/2022 06:09 AM     10/04/2022 06:09 AM    CO2 20 10/04/2022 06:09 AM    BUN 11 10/04/2022 06:09 AM    CREATININE 0.70 10/04/2022 06:09 AM    GFRAA >60 10/03/2022 05:41 AM    LABGLOM >60 10/04/2022 06:09 AM    GLUCOSE 93 10/04/2022 06:09 AM    PROT 4.1 10/03/2022 05:41 AM    LABALBU 2.6 10/03/2022 05:41 AM    CALCIUM 7.4 10/04/2022 06:09 AM    BILITOT 0.3 10/03/2022 05:41 AM    ALKPHOS 108 10/03/2022 05:41 AM    AST 8 10/03/2022 05:41 AM    ALT 6 10/03/2022 05:41 AM     BMP:    Lab Results   Component Value Date/Time     10/04/2022 06:09 AM    K 4.3 10/04/2022 06:09 AM     10/04/2022 06:09 AM    CO2 20 10/04/2022 06:09 AM    BUN 11 10/04/2022 06:09 AM    LABALBU 2.6 10/03/2022 05:41 AM    CREATININE 0.70 10/04/2022 06:09 AM    CALCIUM 7.4 10/04/2022 06:09 AM GFRAA >60 10/03/2022 05:41 AM    LABGLOM >60 10/04/2022 06:09 AM    GLUCOSE 93 10/04/2022 06:09 AM     PT/INR:    Lab Results   Component Value Date/Time    PROTIME 12.2 10/02/2022 07:31 PM    INR 0.9 10/02/2022 07:31 PM     PTT:    Lab Results   Component Value Date/Time    APTT 32.4 10/02/2022 07:31 PM   [APTT}    Physical Examination:        General appearance: alert, cooperative and no distress  Mental Status: oriented to person, place and time and normal affect  Lungs: clear to auscultation bilaterally, normal effort  Heart: regular rate and rhythm, no murmur,  Abdomen: soft, nontender, nondistended, bowel sounds present all four quadrants, no masses, hepatomegaly or splenomegaly  Extremities: no edema, redness or tenderness in the calves  Skin: no gross lesions, rashes, or induration    Assessment:        Primary Problem  Intractable diarrhea     Active Hospital Problems    Diagnosis Date Noted    Type 2 diabetes mellitus with hyperglycemia, with long-term current use of insulin (HCC) [E11.65, Z79.4] 10/03/2022     Priority: Medium    Hypokalemia [E87.6] 10/02/2022     Priority: Medium    Intractable diarrhea [R19.7] 10/02/2022     Priority: Medium    Dehydration [E86.0] 10/02/2022     Priority: Medium    Hypomagnesemia [E83.42] 10/02/2022     Priority: Medium    Essential hypertension [I10] 10/02/2022     Priority: Medium    Small intestinal bacterial overgrowth [K63.89] 10/03/2022     Past Medical History:   Diagnosis Date    Cancer ([facility] [address]     prostate, skin, leukemia    Carpal tunnel syndrome, bilateral upper limbs     Cholecystitis     CLL (chronic lymphocytic leukemia) ([facility] [address]     Clostridium difficile infection     Diabetes mellitus ([facility] [address]     Stomach ulcer         Plan:        Chronic diarrhea  Stool studies so far negative  Pancreatic elastase, fecal fat pending  Started on Questran  Clinically improved  Tolerating diet  May d/c per GI  Follow-up with his gastroenterologist next few weeks  Plans to d/c this afternoon  GI signing off    Time spent reviewing the chart, seeing the patient, and discussing with the attending MD around 30 minutes.     Explained to the patient and d/W Nursing Staff  Will F/U with you  Please call or Page for any issues or change in status  Thanks    Electronically signed by LA Hoyos NP on 10/4/2022 at 2:13 PM

## 2022-10-04 NOTE — PROGRESS NOTES
Physician Progress Note      PATIENT:               Onelia Gar  CSN #:                  [id number]  :                       1950  ADMIT DATE:       10/2/2022 7:16 PM  [address] [place] DATE:  Chioma Deleonpatricia  PROVIDER #:        Emeli Hoffman DO          QUERY TEXT:    Patient admitted with diarrhea. If possible, please document in progress notes   and discharge summary if you are evaluating and /or treating any of the   following: The medical record reflects the following:  Risk Factors: Prostate CA, Diarrhea, DM,  Clinical Indicators: BMI of 18.6, Per Nutrition \"Severe malnutrition related   to impaired nutrient utilization as evidenced by poor intake prior to   admission, weight loss, weight loss greater than or equal to 5% in 1 month,   severe loss of subcutaneous fat, severe muscle loss, Criteria as identified in   malnutrition assessment, Impaired nutrient utilization related to altered GI   function (gastric bypass surgery) as evidenced by GI abnormality, diarrhea\",   He has noticeable fat/muscle mass, loss. Pt is malnourished r/t inability to   absorb nutrients, Pt states when his stools are   Treatment: Nutrition Consult, Ensure    Thank you,  Juan Manuel Hackett RN    ASPEN Criteria:    https://aspenjournals. onlinelibrary. rice. com/doi/full/10.1177/731577674013465  5  Options provided:  -- Protein calorie malnutrition mild  -- Protein calorie malnutrition moderate  -- Protein calorie malnutrition severe  -- Other - I will add my own diagnosis  -- Disagree - Not applicable / Not valid  -- Disagree - Clinically unable to determine / Unknown  -- Refer to Clinical Documentation Reviewer    PROVIDER RESPONSE TEXT:    This patient has severe protein calorie malnutrition. Query created by:  Refugio Nyhan on 10/4/2022 2:10 PM      Electronically signed by:  Emeli Hoffman DO 10/4/2022 4:51 PM

## 2022-10-04 NOTE — PLAN OF CARE
Problem: Discharge Planning  Goal: Discharge to home or other facility with appropriate resources  10/4/2022 1312 by Ayesha Davila RN  Outcome: Progressing  Flowsheets (Taken 10/4/2022 0950)  Discharge to home or other facility with appropriate resources:   Identify barriers to discharge with patient and caregiver   Arrange for needed discharge resources and transportation as appropriate   Identify discharge learning needs (meds, wound care, etc)   Refer to discharge planning if patient needs post-hospital services based on physician order or complex needs related to functional status, cognitive ability or social support system     Problem: Safety - Adult  Goal: Free from fall injury  10/4/2022 1312 by Ayesha Davila RN  Outcome: Progressing     Problem: Infection - Adult  Goal: Absence of infection at discharge  10/4/2022 1312 by Ayesha Davila RN  Outcome: Progressing  Flowsheets (Taken 10/4/2022 0950)  Absence of infection at discharge:   Assess and monitor for signs and symptoms of infection   Monitor lab/diagnostic results   Administer medications as ordered     Problem: Metabolic/Fluid and Electrolytes - Adult  Goal: Electrolytes maintained within normal limits  10/4/2022 1312 by Ayesha Davila RN  Outcome: Progressing  Flowsheets (Taken 10/4/2022 0950)  Electrolytes maintained within normal limits:   Monitor labs and assess patient for signs and symptoms of electrolyte imbalances   Administer electrolyte replacement as ordered   Monitor response to electrolyte replacements, including repeat lab results as appropriate     Problem: Metabolic/Fluid and Electrolytes - Adult  Goal: Hemodynamic stability and optimal renal function maintained  10/4/2022 1312 by Ayesha Davila RN  Outcome: Progressing     Problem: Metabolic/Fluid and Electrolytes - Adult  Goal: Glucose maintained within prescribed range  10/4/2022 1312 by Ayesha Davila RN  Outcome: Progressing  Flowsheets (Taken 10/4/2022 0950)  Glucose maintained within prescribed range:   Monitor blood glucose as ordered   Assess for signs and symptoms of hyperglycemia and hypoglycemia   Administer ordered medications to maintain glucose within target range   Assess barriers to adequate nutritional intake and initiate nutrition consult as needed   Instruct patient on self management of diabetes and initiate consult as needed     Problem: Cardiovascular - Adult  Goal: Maintains optimal cardiac output and hemodynamic stability  10/4/2022 1312 by Daniele Morales RN  Outcome: Progressing  Flowsheets (Taken 10/4/2022 0950)  Maintains optimal cardiac output and hemodynamic stability:   Monitor blood pressure and heart rate   Monitor urine output and notify Licensed Independent Practitioner for values outside of normal range   Assess for signs of decreased cardiac output     Problem: Gastrointestinal - Adult  Goal: Minimal or absence of nausea and vomiting  10/4/2022 1312 by Daniele Morales RN  Outcome: Completed     Problem: Gastrointestinal - Adult  Goal: Maintains or returns to baseline bowel function  10/4/2022 1312 by Daniele Morales RN  Outcome: Progressing  [address] (Taken 10/4/2022 0950)  Maintains or returns to baseline bowel function:   Assess bowel function   Encourage oral fluids to ensure adequate hydration   Administer IV fluids as ordered to ensure adequate hydration   Administer ordered medications as needed   Encourage mobilization and activity     Problem: Gastrointestinal - Adult  Goal: Maintains adequate nutritional intake  10/4/2022 1312 by Daniele Morales RN  Outcome: Progressing  Flowsheets (Taken 10/4/2022 0950)  Maintains adequate nutritional intake: Monitor percentage of each meal consumed     Problem: Musculoskeletal - Adult  Goal: Return mobility to safest level of function  10/4/2022 1312 by Daniele Morales RN  Outcome: Progressing  Flowsheets (Taken 10/4/2022 1312)  Return Mobility to Safest Level of Function: Assess patient stability and activity tolerance for standing, transferring and ambulating with or without assistive devices     Problem: Neurosensory - Adult  Goal: Achieves maximal functionality and self care  Outcome: Progressing     Problem: Chronic Conditions and Co-morbidities  Goal: Patient's chronic conditions and co-morbidity symptoms are monitored and maintained or improved  10/4/2022 1312 by Regine Ying RN  Outcome: Progressing  Flowsheets (Taken 10/4/2022 0988)  Care Plan - Patient's Chronic Conditions and Co-Morbidity Symptoms are Monitored and Maintained or Improved:   Monitor and assess patient's chronic conditions and comorbid symptoms for stability, deterioration, or improvement   Collaborate with multidisciplinary team to address chronic and comorbid conditions and prevent exacerbation or deterioration   Update acute care plan with appropriate goals if chronic or comorbid symptoms are exacerbated and prevent overall improvement and discharge

## 2022-10-04 NOTE — PROGRESS NOTES
Patient found standing at bedside with open pill bottle of benardyl spilled on the floor with other white pills mixed in. RN asked patient if he took any pills. Patient denies taking any pills. RN locked pills in patient lock up.

## 2022-10-04 NOTE — PLAN OF CARE
Problem: Discharge Planning  Goal: Discharge to home or other facility with appropriate resources  Outcome: Progressing     Problem: Safety - Adult  Goal: Free from fall injury  Outcome: Progressing     Problem: Infection - Adult  Goal: Absence of infection at discharge  Outcome: Progressing  Goal: Absence of infection during hospitalization  Outcome: Progressing     Problem: Metabolic/Fluid and Electrolytes - Adult  Goal: Electrolytes maintained within normal limits  Outcome: Progressing  Goal: Hemodynamic stability and optimal renal function maintained  Outcome: Progressing  Goal: Glucose maintained within prescribed range  Outcome: Progressing     Problem: Cardiovascular - Adult  Goal: Maintains optimal cardiac output and hemodynamic stability  Outcome: Progressing     Problem: Gastrointestinal - Adult  Goal: Minimal or absence of nausea and vomiting  Outcome: Progressing  Goal: Maintains or returns to baseline bowel function  Outcome: Progressing  Goal: Maintains adequate nutritional intake  Outcome: Progressing     Problem: Musculoskeletal - Adult  Goal: Return mobility to safest level of function  Outcome: Progressing  Goal: Return ADL status to a safe level of function  Outcome: Progressing     Problem: Chronic Conditions and Co-morbidities  Goal: Patient's chronic conditions and co-morbidity symptoms are monitored and maintained or improved  Outcome: Progressing     Problem: Nutrition Deficit:  Goal: Optimize nutritional status  Outcome: Progressing

## 2022-10-04 NOTE — DISCHARGE SUMMARY
[facility]  Office: [address], DO, Jorge , DO, Amber Sender, DO, Michael December Blood, DO, Farideh Tian MD, Rowan Macias MD, Evita Franco MD, Adela Morley MD,  David Jovel MD, Lizbeth Duran MD, Tierra Kinney, DO, Melene Boast, MD,  Lena Morales MD, Navdeep Leija MD, Syed Chance, DO, Meli Green MD, Dayami Russo MD, Marvin Hartman MD, Aidee Collins MD, Gaurav Pop MD, Sg Joiner MD, Fernand Dance, DO, Larry Irby MD, Odell Hodge MD, Jacinto Perez, CNP,  Leopoldo Vines, CNP, Andre South, CNP, Savanah Ivey, CNP,  Андрей Adair, [facility], Willard Ortega, CNP, Abiodun aSleem, CNP, Alejandro Saha, CNP, Luke Hensley, CNP, Sulaiman Vidales, CNP, Fredo Guerra PA-C, Alivia Greenberg, CNS, Tam Cormier, [facility], Mohan Duarte, CNP, Denise Orlando, CNP, Jovanni Duttonne, [facility]    Discharge Summary     Patient ID: Katie Bender  :  1950   MRN: [id number]     ACCOUNT:  [de-identified]   Patient's PCP: Dennis Kunz MD  Admit Date: 10/2/2022   Discharge Date: 10/4/2022     Length of Stay: 2  Code Status:  Full Code  Admitting Physician: Sabas Cabrera DO  Discharge Physician: LA Mcclelland NP     Active Discharge Diagnoses:     Hospital Problem Lists:  Principal Problem:    Dumping syndrome  Active Problems:    Dehydration    Essential hypertension    Type 2 diabetes mellitus with hyperglycemia, with long-term current use of insulin (HCC)    Small intestinal bacterial overgrowth  Resolved Problems:    Hypokalemia    Hypomagnesemia      Admission Condition:  fair     Discharged Condition: stable    Hospital Stay:     Hospital Course:  Katie Bender is a 74 y.o. male with a history of previous GI work-up who was admitted for the management of  Dumping syndrome , presented to ER with Dehydration, Diarrhea, and Fatigue  Patient was started on IV fluids and his electrolytes were replaced. He was evaluated by GI who felt his symptoms were consistent with dumping syndrome secondary to his previous history of gastric bypass. He was started on bulking agents, probiotics and Questran and his diarrhea has resolved. Significant therapeutic interventions: See above    Significant Diagnostic Studies:   Labs / Micro:  CBC:   Lab Results   Component Value Date/Time    WBC 4.9 10/04/2022 06:09 AM    RBC 3.25 10/04/2022 06:09 AM    HGB 9.6 10/04/2022 06:09 AM    HCT 31.7 10/04/2022 06:09 AM    MCV 97.5 10/04/2022 06:09 AM    MCH 29.5 10/04/2022 06:09 AM    MCHC 30.3 10/04/2022 06:09 AM    RDW 14.3 10/04/2022 06:09 AM     10/04/2022 06:09 AM     BMP:    Lab Results   Component Value Date/Time    GLUCOSE 93 10/04/2022 06:09 AM     10/04/2022 06:09 AM    K 4.3 10/04/2022 06:09 AM     10/04/2022 06:09 AM    CO2 20 10/04/2022 06:09 AM    ANIONGAP 8 10/04/2022 06:09 AM    BUN 11 10/04/2022 06:09 AM    CREATININE 0.70 10/04/2022 06:09 AM    BUNCRER 16 10/04/2022 06:09 AM    CALCIUM 7.4 10/04/2022 06:09 AM    LABGLOM >60 10/04/2022 06:09 AM    GFRAA >60 10/03/2022 05:41 AM    GFR      10/03/2022 05:41 AM     Radiology:  No results found. Consultations:    Consults:     Final Specialist Recommendations/Findings:   IP CONSULT TO INTERNAL MEDICINE  IP CONSULT TO GI  IP CONSULT TO DIETITIAN      The patient was seen and examined on day of discharge and this discharge summary is in conjunction with any daily progress note from day of discharge.     Discharge plan:     Disposition: Home    Physician Follow Up:   MD Ashly Dawson [facility] [phone]  [facility] [phone]    Schedule an appointment as soon as possible for a visit in 1 week(s)      Lissa Bruce, [address] [place] [address] [phone]  [phone]    Schedule an appointment as soon as possible for a visit  As needed       Requiring Further Evaluation/Follow Up POST HOSPITALIZATION/Incidental Findings: Outpatient follow-up with GI    Diet: diabetic diet    Activity: As tolerated    Instructions to Patient: Take all medications as prescribed, continue diet changes and bulking agents as recommended, follow-up with healthcare providers as discussed    Discharge Medications:      Medication List        START taking these medications      cholestyramine light 4 g packet  Take 1 packet by mouth 2 times daily     lactobacillus Tabs  Take 1 tablet by mouth 3 times daily            CONTINUE taking these medications      Basaglar KwikPen 100 UNIT/ML injection pen  Generic drug: insulin glargine     BD Pen Needle Sujata 2nd Gen 32G X 4 MM Misc  Generic drug: Insulin Pen Needle     cyanocobalamin 100 MCG tablet     diphenoxylate-atropine 2.5-0.025 MG per tablet  Commonly known as: LOMOTIL     famotidine 20 MG tablet  Commonly known as: PEPCID     ferrous sulfate 325 (65 Fe) MG tablet  Commonly known as: IRON 325     gabapentin 100 MG capsule  Commonly known as: NEURONTIN     loperamide 2 MG tablet  Commonly known as: IMODIUM A-D     metFORMIN 500 MG tablet  Commonly known as: GLUCOPHAGE     SITagliptin 100 MG tablet  Commonly known as: JANUVIA     traMADol 50 MG tablet  Commonly known as: ULTRAM     vitamin C 500 MG tablet  Commonly known as: ASCORBIC ACID     vitamin D 50 MCG (2000 UT) Tabs tablet  Commonly known as: CHOLECALCIFEROL     zolpidem 5 MG tablet  Commonly known as: AMBIEN            STOP taking these medications      furosemide 20 MG tablet  Commonly known as: LASIX     levocetirizine 5 MG tablet  Commonly known as: XYZAL     Naproxen Sodium 220 MG Caps               Where to Get Your Medications        These medications were sent to [address] [place] [id number]      Phone: [phone]   cholestyramine light 4 g packet  lactobacillus Tabs         No discharge procedures on file.     Time Spent on discharge is  32 mins in patient examination, evaluation, counseling as well as medication reconciliation, prescriptions for required medications, discharge plan and follow up. Electronically signed by   LA Milian NP  10/4/2022  2:51 PM      Thank you Dr. Selina Rashid MD for the opportunity to be involved in this patient's care.

## 2022-10-08 LAB
FAT QUALITATIVE SPLIT STOOL: NORMAL
FECAL NEUTRAL FAT: NORMAL

## 2022-10-10 LAB — FECAL PANCREATIC ELASTASE-1: <10 UG/G

## 2022-11-01 PROBLEM — E86.0 DEHYDRATION: Status: RESOLVED | Noted: 2022-10-02 | Resolved: 2022-11-01
